# Patient Record
Sex: FEMALE | Race: WHITE | NOT HISPANIC OR LATINO
[De-identification: names, ages, dates, MRNs, and addresses within clinical notes are randomized per-mention and may not be internally consistent; named-entity substitution may affect disease eponyms.]

---

## 2019-01-27 ENCOUNTER — RESULT REVIEW (OUTPATIENT)
Age: 79
End: 2019-01-27

## 2022-12-06 PROBLEM — Z00.00 ENCOUNTER FOR PREVENTIVE HEALTH EXAMINATION: Status: ACTIVE | Noted: 2022-12-06

## 2022-12-07 ENCOUNTER — APPOINTMENT (OUTPATIENT)
Dept: PODIATRY | Facility: CLINIC | Age: 82
End: 2022-12-07

## 2022-12-07 DIAGNOSIS — M19.90 UNSPECIFIED OSTEOARTHRITIS, UNSPECIFIED SITE: ICD-10-CM

## 2022-12-07 DIAGNOSIS — B35.1 TINEA UNGUIUM: ICD-10-CM

## 2022-12-07 DIAGNOSIS — D64.9 ANEMIA, UNSPECIFIED: ICD-10-CM

## 2022-12-07 DIAGNOSIS — H35.30 UNSPECIFIED MACULAR DEGENERATION: ICD-10-CM

## 2022-12-07 DIAGNOSIS — Z83.511 FAMILY HISTORY OF GLAUCOMA: ICD-10-CM

## 2022-12-07 DIAGNOSIS — Z86.69 PERSONAL HISTORY OF OTHER DISEASES OF THE NERVOUS SYSTEM AND SENSE ORGANS: ICD-10-CM

## 2022-12-07 DIAGNOSIS — C43.9 MALIGNANT MELANOMA OF SKIN, UNSPECIFIED: ICD-10-CM

## 2022-12-07 PROCEDURE — 10060 I&D ABSCESS SIMPLE/SINGLE: CPT | Mod: RT

## 2022-12-07 PROCEDURE — 99203 OFFICE O/P NEW LOW 30 MIN: CPT | Mod: 25

## 2022-12-07 RX ORDER — TIMOLOL MALEATE 5 MG/ML
0.5 SOLUTION OPHTHALMIC
Qty: 10 | Refills: 0 | Status: ACTIVE | COMMUNITY
Start: 2022-11-22

## 2022-12-07 RX ORDER — LATANOPROST/PF 0.005 %
0.01 DROPS OPHTHALMIC (EYE)
Qty: 8 | Refills: 0 | Status: ACTIVE | COMMUNITY
Start: 2022-10-31

## 2022-12-07 RX ORDER — CIPROFLOXACIN HYDROCHLORIDE 250 MG/1
250 TABLET, FILM COATED ORAL
Qty: 20 | Refills: 0 | Status: DISCONTINUED | COMMUNITY
Start: 2022-07-25

## 2022-12-13 PROBLEM — B35.1 ONYCHOMYCOSIS: Status: ACTIVE | Noted: 2022-12-08

## 2022-12-13 NOTE — PHYSICAL EXAM
[1+] : left foot dorsalis pedis 1+ [No Joint Swelling] : no joint swelling [] : normal strength/tone [Normal Foot/Ankle] : Both lower extremities were exposed and visualized. Standing exam demonstrates normal foot posture and alignment. Hindfoot exam shows no hindfoot valgus or varus [Sensation] : the sensory exam was normal to light touch and pinprick [No Focal Deficits] : no focal deficits [Deep Tendon Reflexes (DTR)] : deep tendon reflexes were 2+ and symmetric [Motor Exam] : the motor exam was normal [FreeTextEntry3] : Positive hair growth. CFT: 1 second. [FreeTextEntry1] : Hallux mycotic nails. Distal 50% of the nail with onychomycosis and debris. Right tibial ingrown nail with pustule and paronychia at the tibial nail fold. It is inflamed and painful.

## 2022-12-13 NOTE — HISTORY OF PRESENT ILLNESS
[Other: ____] : [unfilled] [Cane] : a cane [FreeTextEntry1] : Patient presents today because of a right tibial ingrown nail with pain and pustule and paronychia at the right tibial border. She never had a problem before.She also has onychomycotic nails of the hallux bilateral. She follows with Dr. Flores who she last saw 4 months ago.\par

## 2022-12-13 NOTE — ASSESSMENT
[FreeTextEntry1] : \par Impression: Right hallux tibial ingrown nail.  Paronychia. Onychomycosis hallux nails bilateral. Pain.\par \par Treatment: The patient consented. I prepped the right hallux with alcohol. I used Ethyl Chloride and injected 2cc's Xylocaine 1% and performed a partial nail plate avulsion with I&D paronychia, right tibial border. It bled well.It is healthy. I put a Silvadene dressing on. She will leave it on until tomorrow. I expect this heals uneventfully. With any problems or worsening she will call the office. I also debrided mycotic nails without incident.

## 2023-05-30 ENCOUNTER — APPOINTMENT (OUTPATIENT)
Dept: PODIATRY | Facility: CLINIC | Age: 83
End: 2023-05-30
Payer: MEDICARE

## 2023-05-30 PROCEDURE — 99212 OFFICE O/P EST SF 10 MIN: CPT

## 2023-06-01 NOTE — ASSESSMENT
[FreeTextEntry1] : \par Impression: Right fibular ingrown nail. Pain.\par \par Treatment: I prepped the area with alcohol. I used topical Xylocaine. I used a small straight nail splitter and Tanacross blade to remove the offending spicule back towards the eponychium. I irrigated and cleansed it. I put a Silvadene dressing on. I gave her soaking instructions. Follow-up in the office only with continued pain that persists.

## 2023-06-01 NOTE — PHYSICAL EXAM
[1+] : left foot dorsalis pedis 1+ [No Joint Swelling] : no joint swelling [] : normal strength/tone [Normal Foot/Ankle] : Both lower extremities were exposed and visualized. Standing exam demonstrates normal foot posture and alignment. Hindfoot exam shows no hindfoot valgus or varus [Sensation] : the sensory exam was normal to light touch and pinprick [No Focal Deficits] : no focal deficits [Deep Tendon Reflexes (DTR)] : deep tendon reflexes were 2+ and symmetric [Motor Exam] : the motor exam was normal [FreeTextEntry3] : Positive hair growth. CFT: 1 second. [FreeTextEntry1] : Right hallux fibular incurvated nail. It is erythematous, inflamed and irritated. There is no aniya pus but a lot of pain.

## 2023-11-29 ENCOUNTER — APPOINTMENT (OUTPATIENT)
Dept: PODIATRY | Facility: CLINIC | Age: 83
End: 2023-11-29
Payer: MEDICARE

## 2023-11-29 DIAGNOSIS — M79.674 PAIN IN RIGHT TOE(S): ICD-10-CM

## 2023-11-29 DIAGNOSIS — L60.0 INGROWING NAIL: ICD-10-CM

## 2023-11-29 PROCEDURE — 99212 OFFICE O/P EST SF 10 MIN: CPT

## 2023-12-04 PROBLEM — M79.674 PAIN OF RIGHT GREAT TOE: Status: ACTIVE | Noted: 2023-05-31

## 2023-12-04 PROBLEM — L60.0 INGROWN NAIL: Status: ACTIVE | Noted: 2022-12-08

## 2024-03-05 ENCOUNTER — APPOINTMENT (OUTPATIENT)
Dept: PODIATRY | Facility: CLINIC | Age: 84
End: 2024-03-05
Payer: MEDICARE

## 2024-03-05 DIAGNOSIS — M79.675 PAIN IN RIGHT TOE(S): ICD-10-CM

## 2024-03-05 DIAGNOSIS — M79.674 PAIN IN RIGHT TOE(S): ICD-10-CM

## 2024-03-05 DIAGNOSIS — L03.031 CELLULITIS OF RIGHT TOE: ICD-10-CM

## 2024-03-05 DIAGNOSIS — L03.032 CELLULITIS OF LEFT TOE: ICD-10-CM

## 2024-03-05 PROCEDURE — 99212 OFFICE O/P EST SF 10 MIN: CPT

## 2024-03-05 NOTE — REASON FOR VISIT
[Follow-Up Visit] : a follow-up visit for [Foot Pain] : foot pain [Ingrown Nail] : ingrown nail [Onychomycosis] : onychomycosis

## 2024-03-06 PROBLEM — M79.674 PAIN IN TOES OF BOTH FEET: Status: ACTIVE | Noted: 2022-12-08

## 2024-03-06 PROBLEM — L03.031 PARONYCHIA OF TOENAIL OF RIGHT FOOT: Status: ACTIVE | Noted: 2022-12-08

## 2024-03-07 PROBLEM — L03.032 PARONYCHIA OF TOENAIL OF LEFT FOOT: Status: ACTIVE | Noted: 2024-03-06

## 2024-03-07 NOTE — HISTORY OF PRESENT ILLNESS
[FreeTextEntry1] : Patient presents today. She is 83 years old. She has bilateral ingrown nails at the hallux fibular border with paronychia. There is a little distal abscess with inflammation. There is pain with redness and localized infection.

## 2024-03-07 NOTE — PHYSICAL EXAM
[1+] : left foot dorsalis pedis 1+ [No Joint Swelling] : no joint swelling [] : normal strength/tone [Normal Foot/Ankle] : Both lower extremities were exposed and visualized. Standing exam demonstrates normal foot posture and alignment. Hindfoot exam shows no hindfoot valgus or varus [Sensation] : the sensory exam was normal to light touch and pinprick [No Focal Deficits] : no focal deficits [Deep Tendon Reflexes (DTR)] : deep tendon reflexes were 2+ and symmetric [Motor Exam] : the motor exam was normal [FreeTextEntry3] : Positive hair growth. CFT: 1 second. [FreeTextEntry1] : Hallux nails are mycotic and ingrown.  There is fibular border distal abscess with erythema, inflammation and pain.

## 2024-03-07 NOTE — ASSESSMENT
[FreeTextEntry1] : Impression: Paronychia. Pain bilateral hallux nails.  Treatment:  After obtaining verbal consent, a time out was performed to the identified area of maximal tenderness. I prepped both toes with 70% alcohol, opened a debridement tray and ended up debriding the nail back and performing a wedge resection, which allowed the paronychia drain easily. I cleansed the area. I put an antibiotic dressing on that she will leave on until tomorrow. She could soak with warm water and Epsom salt. I expect this heals uneventfully.

## 2024-03-10 ENCOUNTER — INPATIENT (INPATIENT)
Facility: HOSPITAL | Age: 84
LOS: 8 days | Discharge: NURSING, REHAB OR LONG-TERM CARE | DRG: 871 | End: 2024-03-19
Attending: INTERNAL MEDICINE | Admitting: INTERNAL MEDICINE
Payer: MEDICARE

## 2024-03-10 VITALS
SYSTOLIC BLOOD PRESSURE: 149 MMHG | RESPIRATION RATE: 20 BRPM | HEART RATE: 116 BPM | DIASTOLIC BLOOD PRESSURE: 62 MMHG | OXYGEN SATURATION: 94 %

## 2024-03-10 DIAGNOSIS — H35.30 UNSPECIFIED MACULAR DEGENERATION: ICD-10-CM

## 2024-03-10 DIAGNOSIS — D69.6 THROMBOCYTOPENIA, UNSPECIFIED: ICD-10-CM

## 2024-03-10 DIAGNOSIS — N39.0 URINARY TRACT INFECTION, SITE NOT SPECIFIED: ICD-10-CM

## 2024-03-10 DIAGNOSIS — A41.9 SEPSIS, UNSPECIFIED ORGANISM: ICD-10-CM

## 2024-03-10 DIAGNOSIS — Z29.9 ENCOUNTER FOR PROPHYLACTIC MEASURES, UNSPECIFIED: ICD-10-CM

## 2024-03-10 DIAGNOSIS — E78.5 HYPERLIPIDEMIA, UNSPECIFIED: ICD-10-CM

## 2024-03-10 DIAGNOSIS — Z96.649 PRESENCE OF UNSPECIFIED ARTIFICIAL HIP JOINT: Chronic | ICD-10-CM

## 2024-03-10 DIAGNOSIS — Z98.49 CATARACT EXTRACTION STATUS, UNSPECIFIED EYE: Chronic | ICD-10-CM

## 2024-03-10 LAB
ADD ON TEST-SPECIMEN IN LAB: SIGNIFICANT CHANGE UP
ALBUMIN SERPL ELPH-MCNC: 4 G/DL — SIGNIFICANT CHANGE UP (ref 3.3–5)
ALP SERPL-CCNC: 106 U/L — SIGNIFICANT CHANGE UP (ref 40–120)
ALT FLD-CCNC: 43 U/L — SIGNIFICANT CHANGE UP (ref 10–45)
ANION GAP SERPL CALC-SCNC: 15 MMOL/L — SIGNIFICANT CHANGE UP (ref 5–17)
APPEARANCE UR: ABNORMAL
APTT BLD: 32.6 SEC — SIGNIFICANT CHANGE UP (ref 24.5–35.6)
AST SERPL-CCNC: 43 U/L — HIGH (ref 10–40)
BACTERIA # UR AUTO: ABNORMAL /HPF
BASE EXCESS BLDV CALC-SCNC: -1.1 MMOL/L — SIGNIFICANT CHANGE UP (ref -2–3)
BASE EXCESS BLDV CALC-SCNC: -11.6 MMOL/L — LOW (ref -2–3)
BASE EXCESS BLDV CALC-SCNC: -5.1 MMOL/L — LOW (ref -2–3)
BASOPHILS # BLD AUTO: 0.06 K/UL — SIGNIFICANT CHANGE UP (ref 0–0.2)
BASOPHILS NFR BLD AUTO: 0.9 % — SIGNIFICANT CHANGE UP (ref 0–2)
BILIRUB SERPL-MCNC: 1.2 MG/DL — SIGNIFICANT CHANGE UP (ref 0.2–1.2)
BILIRUB UR-MCNC: NEGATIVE — SIGNIFICANT CHANGE UP
BUN SERPL-MCNC: 35 MG/DL — HIGH (ref 7–23)
CA-I SERPL-SCNC: 0.87 MMOL/L — LOW (ref 1.15–1.33)
CA-I SERPL-SCNC: 1.15 MMOL/L — SIGNIFICANT CHANGE UP (ref 1.15–1.33)
CA-I SERPL-SCNC: 1.2 MMOL/L — SIGNIFICANT CHANGE UP (ref 1.15–1.33)
CALCIUM SERPL-MCNC: 9.4 MG/DL — SIGNIFICANT CHANGE UP (ref 8.4–10.5)
CAST: 5 /LPF — HIGH (ref 0–4)
CHLORIDE BLDV-SCNC: 107 MMOL/L — SIGNIFICANT CHANGE UP (ref 96–108)
CHLORIDE BLDV-SCNC: 110 MMOL/L — HIGH (ref 96–108)
CHLORIDE BLDV-SCNC: 117 MMOL/L — HIGH (ref 96–108)
CHLORIDE SERPL-SCNC: 107 MMOL/L — SIGNIFICANT CHANGE UP (ref 96–108)
CO2 BLDV-SCNC: 14 MMOL/L — LOW (ref 22–26)
CO2 BLDV-SCNC: 21 MMOL/L — LOW (ref 22–26)
CO2 BLDV-SCNC: 25 MMOL/L — SIGNIFICANT CHANGE UP (ref 22–26)
CO2 SERPL-SCNC: 22 MMOL/L — SIGNIFICANT CHANGE UP (ref 22–31)
COLOR SPEC: YELLOW — SIGNIFICANT CHANGE UP
CREAT SERPL-MCNC: 1.25 MG/DL — SIGNIFICANT CHANGE UP (ref 0.5–1.3)
DIFF PNL FLD: ABNORMAL
EGFR: 43 ML/MIN/1.73M2 — LOW
EOSINOPHIL # BLD AUTO: 0 K/UL — SIGNIFICANT CHANGE UP (ref 0–0.5)
EOSINOPHIL NFR BLD AUTO: 0 % — SIGNIFICANT CHANGE UP (ref 0–6)
FLUAV AG NPH QL: SIGNIFICANT CHANGE UP
FLUBV AG NPH QL: SIGNIFICANT CHANGE UP
GAS PNL BLDV: 140 MMOL/L — SIGNIFICANT CHANGE UP (ref 136–145)
GAS PNL BLDV: 141 MMOL/L — SIGNIFICANT CHANGE UP (ref 136–145)
GAS PNL BLDV: 142 MMOL/L — SIGNIFICANT CHANGE UP (ref 136–145)
GAS PNL BLDV: SIGNIFICANT CHANGE UP
GLUCOSE BLDV-MCNC: 123 MG/DL — HIGH (ref 70–99)
GLUCOSE BLDV-MCNC: 129 MG/DL — HIGH (ref 70–99)
GLUCOSE BLDV-MCNC: 142 MG/DL — HIGH (ref 70–99)
GLUCOSE SERPL-MCNC: 130 MG/DL — HIGH (ref 70–99)
GLUCOSE UR QL: NEGATIVE MG/DL — SIGNIFICANT CHANGE UP
HCO3 BLDV-SCNC: 14 MMOL/L — LOW (ref 22–29)
HCO3 BLDV-SCNC: 20 MMOL/L — LOW (ref 22–29)
HCO3 BLDV-SCNC: 24 MMOL/L — SIGNIFICANT CHANGE UP (ref 22–29)
HCT VFR BLD CALC: 35.5 % — SIGNIFICANT CHANGE UP (ref 34.5–45)
HCT VFR BLDA CALC: 19 % — CRITICAL LOW (ref 34.5–46.5)
HCT VFR BLDA CALC: 28 % — LOW (ref 34.5–46.5)
HCT VFR BLDA CALC: 35 % — SIGNIFICANT CHANGE UP (ref 34.5–46.5)
HGB BLD CALC-MCNC: 11.5 G/DL — LOW (ref 11.7–16.1)
HGB BLD CALC-MCNC: 6.3 G/DL — CRITICAL LOW (ref 11.7–16.1)
HGB BLD CALC-MCNC: 9.3 G/DL — LOW (ref 11.7–16.1)
HGB BLD-MCNC: 11.3 G/DL — LOW (ref 11.5–15.5)
INR BLD: 1.15 RATIO — SIGNIFICANT CHANGE UP (ref 0.85–1.18)
KETONES UR-MCNC: 40 MG/DL
LACTATE BLDV-MCNC: 2.3 MMOL/L — HIGH (ref 0.5–2)
LACTATE BLDV-MCNC: 3.2 MMOL/L — HIGH (ref 0.5–2)
LACTATE BLDV-MCNC: 3.9 MMOL/L — HIGH (ref 0.5–2)
LEUKOCYTE ESTERASE UR-ACNC: ABNORMAL
LYMPHOCYTES # BLD AUTO: 0.43 K/UL — LOW (ref 1–3.3)
LYMPHOCYTES # BLD AUTO: 6.5 % — LOW (ref 13–44)
MAGNESIUM SERPL-MCNC: 1.8 MG/DL — SIGNIFICANT CHANGE UP (ref 1.6–2.6)
MCHC RBC-ENTMCNC: 20.3 PG — LOW (ref 27–34)
MCHC RBC-ENTMCNC: 31.8 GM/DL — LOW (ref 32–36)
MCV RBC AUTO: 63.7 FL — LOW (ref 80–100)
MONOCYTES # BLD AUTO: 0.18 K/UL — SIGNIFICANT CHANGE UP (ref 0–0.9)
MONOCYTES NFR BLD AUTO: 2.8 % — SIGNIFICANT CHANGE UP (ref 2–14)
NEUTROPHILS # BLD AUTO: 5.63 K/UL — SIGNIFICANT CHANGE UP (ref 1.8–7.4)
NEUTROPHILS NFR BLD AUTO: 74.8 % — SIGNIFICANT CHANGE UP (ref 43–77)
NITRITE UR-MCNC: POSITIVE
PCO2 BLDV: 27 MMHG — LOW (ref 39–42)
PCO2 BLDV: 35 MMHG — LOW (ref 39–42)
PCO2 BLDV: 38 MMHG — LOW (ref 39–42)
PH BLDV: 7.31 — LOW (ref 7.32–7.43)
PH BLDV: 7.36 — SIGNIFICANT CHANGE UP (ref 7.32–7.43)
PH BLDV: 7.4 — SIGNIFICANT CHANGE UP (ref 7.32–7.43)
PH UR: 6 — SIGNIFICANT CHANGE UP (ref 5–8)
PHOSPHATE SERPL-MCNC: 2.1 MG/DL — LOW (ref 2.5–4.5)
PLATELET # BLD AUTO: 138 K/UL — LOW (ref 150–400)
PO2 BLDV: 31 MMHG — SIGNIFICANT CHANGE UP (ref 25–45)
PO2 BLDV: 33 MMHG — SIGNIFICANT CHANGE UP (ref 25–45)
PO2 BLDV: 42 MMHG — SIGNIFICANT CHANGE UP (ref 25–45)
POTASSIUM BLDV-SCNC: 2 MMOL/L — CRITICAL LOW (ref 3.5–5.1)
POTASSIUM BLDV-SCNC: 3 MMOL/L — LOW (ref 3.5–5.1)
POTASSIUM BLDV-SCNC: 3.6 MMOL/L — SIGNIFICANT CHANGE UP (ref 3.5–5.1)
POTASSIUM SERPL-MCNC: 3.7 MMOL/L — SIGNIFICANT CHANGE UP (ref 3.5–5.3)
POTASSIUM SERPL-SCNC: 3.7 MMOL/L — SIGNIFICANT CHANGE UP (ref 3.5–5.3)
PROT SERPL-MCNC: 6.7 G/DL — SIGNIFICANT CHANGE UP (ref 6–8.3)
PROT UR-MCNC: 30 MG/DL
PROTHROM AB SERPL-ACNC: 12 SEC — SIGNIFICANT CHANGE UP (ref 9.5–13)
RBC # BLD: 5.57 M/UL — HIGH (ref 3.8–5.2)
RBC # FLD: 15.9 % — HIGH (ref 10.3–14.5)
RBC CASTS # UR COMP ASSIST: 19 /HPF — HIGH (ref 0–4)
RSV RNA NPH QL NAA+NON-PROBE: SIGNIFICANT CHANGE UP
SAO2 % BLDV: 55.4 % — LOW (ref 67–88)
SAO2 % BLDV: 60.2 % — LOW (ref 67–88)
SAO2 % BLDV: 72 % — SIGNIFICANT CHANGE UP (ref 67–88)
SARS-COV-2 RNA SPEC QL NAA+PROBE: SIGNIFICANT CHANGE UP
SODIUM SERPL-SCNC: 144 MMOL/L — SIGNIFICANT CHANGE UP (ref 135–145)
SP GR SPEC: 1.02 — SIGNIFICANT CHANGE UP (ref 1–1.03)
SQUAMOUS # UR AUTO: 1 /HPF — SIGNIFICANT CHANGE UP (ref 0–5)
UROBILINOGEN FLD QL: 0.2 MG/DL — SIGNIFICANT CHANGE UP (ref 0.2–1)
WBC # BLD: 6.55 K/UL — SIGNIFICANT CHANGE UP (ref 3.8–10.5)
WBC # FLD AUTO: 6.55 K/UL — SIGNIFICANT CHANGE UP (ref 3.8–10.5)
WBC UR QL: 124 /HPF — HIGH (ref 0–5)

## 2024-03-10 PROCEDURE — 71045 X-RAY EXAM CHEST 1 VIEW: CPT | Mod: 26

## 2024-03-10 PROCEDURE — 99223 1ST HOSP IP/OBS HIGH 75: CPT

## 2024-03-10 PROCEDURE — 99285 EMERGENCY DEPT VISIT HI MDM: CPT

## 2024-03-10 PROCEDURE — 70450 CT HEAD/BRAIN W/O DYE: CPT | Mod: 26,MC

## 2024-03-10 RX ORDER — POTASSIUM PHOSPHATE, MONOBASIC POTASSIUM PHOSPHATE, DIBASIC 236; 224 MG/ML; MG/ML
15 INJECTION, SOLUTION INTRAVENOUS ONCE
Refills: 0 | Status: COMPLETED | OUTPATIENT
Start: 2024-03-10 | End: 2024-03-11

## 2024-03-10 RX ORDER — SODIUM CHLORIDE 9 MG/ML
1000 INJECTION INTRAMUSCULAR; INTRAVENOUS; SUBCUTANEOUS ONCE
Refills: 0 | Status: COMPLETED | OUTPATIENT
Start: 2024-03-10 | End: 2024-03-10

## 2024-03-10 RX ORDER — TIMOLOL 0.5 %
1 DROPS OPHTHALMIC (EYE)
Refills: 0 | Status: DISCONTINUED | OUTPATIENT
Start: 2024-03-10 | End: 2024-03-19

## 2024-03-10 RX ORDER — LATANOPROST 0.05 MG/ML
1 SOLUTION/ DROPS OPHTHALMIC; TOPICAL AT BEDTIME
Refills: 0 | Status: DISCONTINUED | OUTPATIENT
Start: 2024-03-10 | End: 2024-03-19

## 2024-03-10 RX ORDER — ACETAMINOPHEN 500 MG
650 TABLET ORAL EVERY 6 HOURS
Refills: 0 | Status: DISCONTINUED | OUTPATIENT
Start: 2024-03-10 | End: 2024-03-19

## 2024-03-10 RX ORDER — ACETAMINOPHEN 500 MG
1000 TABLET ORAL ONCE
Refills: 0 | Status: COMPLETED | OUTPATIENT
Start: 2024-03-10 | End: 2024-03-10

## 2024-03-10 RX ORDER — PIPERACILLIN AND TAZOBACTAM 4; .5 G/20ML; G/20ML
3.38 INJECTION, POWDER, LYOPHILIZED, FOR SOLUTION INTRAVENOUS ONCE
Refills: 0 | Status: COMPLETED | OUTPATIENT
Start: 2024-03-11 | End: 2024-03-11

## 2024-03-10 RX ORDER — POTASSIUM CHLORIDE 20 MEQ
10 PACKET (EA) ORAL
Refills: 0 | Status: DISCONTINUED | OUTPATIENT
Start: 2024-03-10 | End: 2024-03-10

## 2024-03-10 RX ORDER — ATORVASTATIN CALCIUM 80 MG/1
20 TABLET, FILM COATED ORAL AT BEDTIME
Refills: 0 | Status: DISCONTINUED | OUTPATIENT
Start: 2024-03-10 | End: 2024-03-19

## 2024-03-10 RX ORDER — ENOXAPARIN SODIUM 100 MG/ML
40 INJECTION SUBCUTANEOUS EVERY 24 HOURS
Refills: 0 | Status: DISCONTINUED | OUTPATIENT
Start: 2024-03-10 | End: 2024-03-19

## 2024-03-10 RX ORDER — PIPERACILLIN AND TAZOBACTAM 4; .5 G/20ML; G/20ML
3.38 INJECTION, POWDER, LYOPHILIZED, FOR SOLUTION INTRAVENOUS ONCE
Refills: 0 | Status: COMPLETED | OUTPATIENT
Start: 2024-03-10 | End: 2024-03-10

## 2024-03-10 RX ORDER — VANCOMYCIN HCL 1 G
1000 VIAL (EA) INTRAVENOUS ONCE
Refills: 0 | Status: COMPLETED | OUTPATIENT
Start: 2024-03-10 | End: 2024-03-10

## 2024-03-10 RX ORDER — PIPERACILLIN AND TAZOBACTAM 4; .5 G/20ML; G/20ML
3.38 INJECTION, POWDER, LYOPHILIZED, FOR SOLUTION INTRAVENOUS EVERY 8 HOURS
Refills: 0 | Status: DISCONTINUED | OUTPATIENT
Start: 2024-03-11 | End: 2024-03-12

## 2024-03-10 RX ADMIN — Medication 250 MILLIGRAM(S): at 15:15

## 2024-03-10 RX ADMIN — SODIUM CHLORIDE 1000 MILLILITER(S): 9 INJECTION INTRAMUSCULAR; INTRAVENOUS; SUBCUTANEOUS at 22:39

## 2024-03-10 RX ADMIN — PIPERACILLIN AND TAZOBACTAM 200 GRAM(S): 4; .5 INJECTION, POWDER, LYOPHILIZED, FOR SOLUTION INTRAVENOUS at 14:59

## 2024-03-10 RX ADMIN — SODIUM CHLORIDE 1000 MILLILITER(S): 9 INJECTION INTRAMUSCULAR; INTRAVENOUS; SUBCUTANEOUS at 16:58

## 2024-03-10 RX ADMIN — PIPERACILLIN AND TAZOBACTAM 200 GRAM(S): 4; .5 INJECTION, POWDER, LYOPHILIZED, FOR SOLUTION INTRAVENOUS at 22:38

## 2024-03-10 RX ADMIN — SODIUM CHLORIDE 1000 MILLILITER(S): 9 INJECTION INTRAMUSCULAR; INTRAVENOUS; SUBCUTANEOUS at 14:59

## 2024-03-10 RX ADMIN — Medication 400 MILLIGRAM(S): at 14:10

## 2024-03-10 NOTE — H&P ADULT - NSHPPHYSICALEXAM_GEN_ALL_CORE
Vital Signs Last 24 Hrs  T(C): 36.8 (10 Mar 2024 22:05), Max: 39.5 (10 Mar 2024 14:25)  T(F): 98.2 (10 Mar 2024 22:05), Max: 103.1 (10 Mar 2024 14:25)  HR: 92 (10 Mar 2024 22:05) (92 - 116)  BP: 102/56 (10 Mar 2024 22:05) (91/62 - 149/62)  BP(mean): 71 (10 Mar 2024 22:05) (67 - 72)  RR: 20 (10 Mar 2024 22:05) (18 - 26)  SpO2: 97% (10 Mar 2024 22:05) (94% - 98%)    Parameters below as of 10 Mar 2024 22:05  Patient On (Oxygen Delivery Method): nasal cannula, high flow    CONSTITUTIONAL: Well-groomed, in no apparent distress  EYES: No conjunctival or scleral injection, non-icteric;   ENMT: No external nasal lesions; MMM  NECK: Trachea midline without palpable neck mass; thyroid not enlarged and non-tender  RESPIRATORY: Breathing comfortably; no dullness to percussion; Diminished breath sounds over left base. Otherwise lungs CTA without wheeze/rhonchi/rales  CARDIOVASCULAR: +S1S2, RRR, no M/G/R; pedal pulses full and symmetric; no lower extremity edema  GASTROINTESTINAL: No palpable masses or tenderness, +BS throughout, no rebound/guarding; no hepatosplenomegaly; no hernia palpated  LYMPHATIC: No cervical LAD or tenderness  SKIN: No rashes or ulcers noted  NEUROLOGIC: CN II-XII intact; sensation intact in LEs b/l to light touch  PSYCHIATRIC: A&Ox3; mood and affect appropriate; appropriate insight and judgment

## 2024-03-10 NOTE — ED ADULT NURSE NOTE - OBJECTIVE STATEMENT
Patient is a 83y female presenting to the ED via EMS from home s/p fall. According to EMS, family last heard from the patient at approximately 9pm last night but did not hear from her this morning; patient's neighbor found her to be on the ground. Patient is a 83y female presenting to the ED via EMS from home s/p fall. According to EMS, family last heard from the patient at approximately 9pm last night but did not hear from her this morning; patient's neighbor found her to be on the ground. Reports patient was feeling well yesterday. Unknown if patient fell, unknown how long she was on the ground for. No signs of trauma on the head. Patient is warm, diaphoretic, alert to verbal stimuli. Patient is not speaking currently. Respirations spontaneous, even, and labored. Abdomen soft, non-distended and non-tender upon palpation. No swelling noted in bilateral lower extremities. Denies chest pain, SOB, headache, N/V/D, abdominal pain, fever/chills, burning upon urination or difficulty urinating.

## 2024-03-10 NOTE — H&P ADULT - NSHPLABSRESULTS_GEN_ALL_CORE
LABS:                      11.3   6.55  )-----------( 138      ( 10 Mar 2024 14:30 )             35.5     03-10    144  |  107  |  35<H>  ----------------------------<  130<H>  3.7   |  22  |  1.25    Ca    9.4      10 Mar 2024 14:30  Phos  2.1     03-10  Mg     1.8     03-10    TPro  6.7  /  Alb  4.0  /  TBili  1.2  /  DBili  x   /  AST  43<H>  /  ALT  43  /  AlkPhos  106  03-10    CARDIAC MARKERS ( 10 Mar 2024 14:30 )  x     / x     / 41 U/L / x     / x        LIVER FUNCTIONS - ( 10 Mar 2024 14:30 )  Alb: 4.0 g/dL / Pro: 6.7 g/dL / ALK PHOS: 106 U/L / ALT: 43 U/L / AST: 43 U/L / GGT: x           PT/INR - ( 10 Mar 2024 14:30 )   PT: 12.0 sec;   INR: 1.15 ratio    PTT - ( 10 Mar 2024 14:30 )  PTT:32.6 sec    Urinalysis Basic - ( 10 Mar 2024 15:07 )  Color: Yellow / Appearance: Cloudy / S.018 / pH: x  Gluc: x / Ketone: 40 mg/dL  / Bili: Negative / Urobili: 0.2 mg/dL   Blood: x / Protein: 30 mg/dL / Nitrite: Positive   Leuk Esterase: Moderate / RBC: 19 /HPF /  /HPF   Sq Epi: x / Non Sq Epi: 1 /HPF / Bacteria: Too Numerous to count /HPF    IMAGING:  Xray Chest 1 View- PORTABLE-Urgent (03.10.24 @ 15:05)  IMPRESSION:  - Nonspecific patchy retrocardiac opacities, which may reflect infectious etiology in the correct clinical context versus passive atelectasis in this expiratory view    CT Head No Cont (03.10.24 @ 16:30)  IMPRESSION:  - Trace hyperattenuation along the left middle frontal cortex, likely reflecting gyriform mineralization. No other suspicion of acute intracranial bleeding.  - Bilateral occipital encephalomalacia.  - Central volume loss and advanced microvascular ischemic changes.

## 2024-03-10 NOTE — H&P ADULT - PROBLEM SELECTOR PLAN 1
- Febrile w/ leukocytosis and positive UA  - Also w/ atelectasis vs infection on CXR  - Given management w/ broad spectrum abx for now can hold on CT chest as management would remain the same  - s/p Vancomycin and Zosyn in the ED  - c/w Zosyn and Vancomycin for now, narrow zosyn based on cx results and can dc vanco if MRSA PCR negative  - MRSA PCR  - Ur legionella and strep ag  - Blood and Ur Cx   - On HFNC now satting >96%, can downtitrate to NC as tolerated

## 2024-03-10 NOTE — ED ADULT NURSE NOTE - NSFALLRISKINTERV_ED_ALL_ED

## 2024-03-10 NOTE — ED PROVIDER NOTE - OBJECTIVE STATEMENT
83-year-old female with no significant past medical history presents ED brought in by EMS for hypoxia, altered mental status.  Patient was found by a neighbor on the floor after patient's daughter was not able to get in contact with her at any point today.  Patient last spoke with the daughter via text at 930 last night patient was feeling fine and had no complaints.  Patient is currently extremely lethargic and not conversive. Patient is a full code.

## 2024-03-10 NOTE — H&P ADULT - ASSESSMENT
84yo F w/ PMH of HLD, macular degeneration, thalassemia minor and melanoma pw AMS and hypoxia found to be in severe sepsis i/s/o UTI +/- PNA.

## 2024-03-10 NOTE — H&P ADULT - HISTORY OF PRESENT ILLNESS
Pt is an 82yo F w/ PMH of HLD, macular degeneration, thalassemia minor and melanoma pw AMS and hypoxia.     Hx obtained from daughter at bedside along w/ patient.     Reports feeling well yesterday w/out acute symptoms. Today daughter was unable to reach pt so had neighbor perform wellness check at which point pt was found altered in her bed for which EMS was called.     Patient denies any acute symptoms such as F/C, CP, SOB, cough, nasal congestion/rhinorrhea, abd pain, N/V, constipation or diarrhea. Does wear depends when sleeping d/t incontinence but denies any dysuria. She was otherwise unable to recall the events that transpired this afternoon.     In the ED febrile, tachy w/ hypoxia (80% on RA per EMS) w/ lactic acidosis w/ positive UA and CXR w/ c/f PNA vs atelectasis s/p Vanc, Zosyn, 2L IVF and HFNC for hypoxia.

## 2024-03-10 NOTE — H&P ADULT - PROBLEM SELECTOR PLAN 2
- Positive UA w/ use of adult diapers at home  - c/w Abx as above  - f/u Ur cx and narrow abx based on sensitivities

## 2024-03-10 NOTE — ED PROVIDER NOTE - ATTENDING CONTRIBUTION TO CARE
83-year-old female, denies past medical history, brought in by EMS from home for hypoxia (80% was on room air) and lethargy.  Per EMS, patient last tested family at 9:30 PM last night, and seem to be in normal health.  Today family was unable to reach patient, neighbor with keys went to check and found patient down and minimally responsive.  Per family, patient home was very warm (with thermostat turned up) and patient was wearing heavy sweater.  Was assumed patient had significant chills.  Patient unable to participate in interview, thus history provided mostly by EMS and family.  Per daughter, patient able to perform her own ADLs and usually alert and conversive.    Vital signs significant for fever, tachycardia, tachypnea, and hypoxia.  BP is stable at this time.  Vital signs meeting SIRS criteria.  EKG is sinus tachycardia without ischemic changes.  Physical exam significant for ill/lethargic appearing patient in moderate respiratory distress.  Head is normocephalic is atraumatic.  Extraocular movements intact.  Pupils equal round and reactive to light.  No conjunctival pallor.  Oropharynx is clear.  Heart is tachycardic without murmur.  Lungs clear to auscultation (however poor respiratory effort).  Abdomen is soft and nontender/nondistended.  No CVA tenderness.  No lower extremity edema.  Pulses are 2+ throughout.  Neuroexam is nonfocal.  Skin is warm and well-perfused without rashes.    Patient presentation meeting SIRS criteria.  Differential diagnosis includes but not limited to sepsis secondary to UTI versus pneumonia versus viral syndrome.  Plan to perform sepsis workup.  Will send basic labs/electrolytes, blood cultures, UA/urine culture, CK, flu/COVID swab, VBG, chest x-ray, and CT head.  Will start empiric antibiotics, IV fluid, and IV Tylenol.  Patient likely will require medical admission for further IV antibiotics.

## 2024-03-10 NOTE — ED PROVIDER NOTE - PHYSICAL EXAMINATION
GEN: Patient lethargic but responsive   HEENT: normocephalic, atraumatic, dry MM  CARDIAC: RRR, S1, S2, no murmur.   PULM: CTA B/L no wheeze, rhonchi, rales.   ABD: soft NT, ND, no rebound no guarding  MSK: Moving all extremities, no edema.   NEURO: A&Ox1, no focal neurological deficits  SKIN: warm, no rash. Diaphoretic

## 2024-03-10 NOTE — ED ADULT NURSE REASSESSMENT NOTE - NS ED NURSE REASSESS COMMENT FT1
Report received from Salty SIDDIQUI. Pt A&Ox2, oriented to self and location. Pt in no acute distress at time. Family remains at bedside. Pt awaiting bed assignment at this time. Patient safety maintained, bed is in lowest position, wheels locked, and side rails raised.

## 2024-03-10 NOTE — ED CLERICAL - BED REQUESTED
10-Mar-2024 17:42 I have reviewed and confirmed nurses' notes for patient's medications, allergies, medical history, and surgical history.

## 2024-03-10 NOTE — H&P ADULT - PROBLEM SELECTOR PLAN 3
- PLTs 138 on presentation likely i/s/o above  - Monitor while inpt w/ daily CBC and consider change of Lovenox ppx to SCDs if downtrending

## 2024-03-10 NOTE — ED PROVIDER NOTE - CLINICAL SUMMARY MEDICAL DECISION MAKING FREE TEXT BOX
Gulshan Marks, DO PGY-3: 83-year-old female with no significant past medical history presents ED brought in by EMS for hypoxia, altered mental status.  Patient was found by a neighbor on the floor after patient's daughter was not able to get in contact with her at any point today.  Patient last spoke with the daughter via text at 930 last night patient was feeling fine and had no complaints.  Patient is currently extremely lethargic and not conversive. Patient febrile to 103 Fahrenheit, hypoxic 90% on room air, minimal improvement on 6 L nasal cannula will escalate to high flow nasal cannula, otherwise nonfocal physical exam aside from the lethargy.  Differential clues not limited to sepsis secondary to likely UTI versus pneumonia.  Plan for antipyretics, septic workup, IV fluids, antibiotics, high flow nasal cannula.  Admission.

## 2024-03-11 DIAGNOSIS — R78.81 BACTEREMIA: ICD-10-CM

## 2024-03-11 DIAGNOSIS — N39.0 URINARY TRACT INFECTION, SITE NOT SPECIFIED: ICD-10-CM

## 2024-03-11 DIAGNOSIS — J18.9 PNEUMONIA, UNSPECIFIED ORGANISM: ICD-10-CM

## 2024-03-11 LAB
ANION GAP SERPL CALC-SCNC: 13 MMOL/L — SIGNIFICANT CHANGE UP (ref 5–17)
BUN SERPL-MCNC: 33 MG/DL — HIGH (ref 7–23)
CALCIUM SERPL-MCNC: 8.4 MG/DL — SIGNIFICANT CHANGE UP (ref 8.4–10.5)
CHLORIDE SERPL-SCNC: 107 MMOL/L — SIGNIFICANT CHANGE UP (ref 96–108)
CO2 SERPL-SCNC: 20 MMOL/L — LOW (ref 22–31)
CREAT SERPL-MCNC: 1.05 MG/DL — SIGNIFICANT CHANGE UP (ref 0.5–1.3)
E COLI DNA BLD POS QL NAA+NON-PROBE: SIGNIFICANT CHANGE UP
EGFR: 53 ML/MIN/1.73M2 — LOW
GLUCOSE SERPL-MCNC: 117 MG/DL — HIGH (ref 70–99)
GRAM STN FLD: ABNORMAL
HCT VFR BLD CALC: 31 % — LOW (ref 34.5–45)
HGB BLD-MCNC: 9.7 G/DL — LOW (ref 11.5–15.5)
LEGIONELLA AG UR QL: NEGATIVE — SIGNIFICANT CHANGE UP
MAGNESIUM SERPL-MCNC: 1.6 MG/DL — SIGNIFICANT CHANGE UP (ref 1.6–2.6)
MCHC RBC-ENTMCNC: 20 PG — LOW (ref 27–34)
MCHC RBC-ENTMCNC: 31.3 GM/DL — LOW (ref 32–36)
MCV RBC AUTO: 64 FL — LOW (ref 80–100)
METHOD TYPE: SIGNIFICANT CHANGE UP
MRSA PCR RESULT.: SIGNIFICANT CHANGE UP
NRBC # BLD: 0 /100 WBCS — SIGNIFICANT CHANGE UP (ref 0–0)
PHOSPHATE SERPL-MCNC: 3.7 MG/DL — SIGNIFICANT CHANGE UP (ref 2.5–4.5)
PLATELET # BLD AUTO: 138 K/UL — LOW (ref 150–400)
POTASSIUM SERPL-MCNC: 4.1 MMOL/L — SIGNIFICANT CHANGE UP (ref 3.5–5.3)
POTASSIUM SERPL-SCNC: 4.1 MMOL/L — SIGNIFICANT CHANGE UP (ref 3.5–5.3)
RBC # BLD: 4.84 M/UL — SIGNIFICANT CHANGE UP (ref 3.8–5.2)
RBC # FLD: 16.3 % — HIGH (ref 10.3–14.5)
S AUREUS DNA NOSE QL NAA+PROBE: SIGNIFICANT CHANGE UP
S PNEUM AG UR QL: NEGATIVE — SIGNIFICANT CHANGE UP
SODIUM SERPL-SCNC: 140 MMOL/L — SIGNIFICANT CHANGE UP (ref 135–145)
SPECIMEN SOURCE: SIGNIFICANT CHANGE UP
SPECIMEN SOURCE: SIGNIFICANT CHANGE UP
WBC # BLD: 27.92 K/UL — HIGH (ref 3.8–10.5)
WBC # FLD AUTO: 27.92 K/UL — HIGH (ref 3.8–10.5)

## 2024-03-11 PROCEDURE — 71250 CT THORAX DX C-: CPT | Mod: 26

## 2024-03-11 PROCEDURE — 99222 1ST HOSP IP/OBS MODERATE 55: CPT

## 2024-03-11 RX ORDER — CHLORHEXIDINE GLUCONATE 213 G/1000ML
1 SOLUTION TOPICAL DAILY
Refills: 0 | Status: DISCONTINUED | OUTPATIENT
Start: 2024-03-11 | End: 2024-03-19

## 2024-03-11 RX ORDER — VANCOMYCIN HCL 1 G
750 VIAL (EA) INTRAVENOUS EVERY 24 HOURS
Refills: 0 | Status: DISCONTINUED | OUTPATIENT
Start: 2024-03-11 | End: 2024-03-11

## 2024-03-11 RX ORDER — IPRATROPIUM/ALBUTEROL SULFATE 18-103MCG
3 AEROSOL WITH ADAPTER (GRAM) INHALATION EVERY 6 HOURS
Refills: 0 | Status: DISCONTINUED | OUTPATIENT
Start: 2024-03-11 | End: 2024-03-19

## 2024-03-11 RX ORDER — ONDANSETRON 8 MG/1
4 TABLET, FILM COATED ORAL ONCE
Refills: 0 | Status: COMPLETED | OUTPATIENT
Start: 2024-03-11 | End: 2024-03-11

## 2024-03-11 RX ADMIN — ENOXAPARIN SODIUM 40 MILLIGRAM(S): 100 INJECTION SUBCUTANEOUS at 06:03

## 2024-03-11 RX ADMIN — PIPERACILLIN AND TAZOBACTAM 25 GRAM(S): 4; .5 INJECTION, POWDER, LYOPHILIZED, FOR SOLUTION INTRAVENOUS at 15:59

## 2024-03-11 RX ADMIN — PIPERACILLIN AND TAZOBACTAM 25 GRAM(S): 4; .5 INJECTION, POWDER, LYOPHILIZED, FOR SOLUTION INTRAVENOUS at 08:06

## 2024-03-11 RX ADMIN — PIPERACILLIN AND TAZOBACTAM 25 GRAM(S): 4; .5 INJECTION, POWDER, LYOPHILIZED, FOR SOLUTION INTRAVENOUS at 02:35

## 2024-03-11 RX ADMIN — Medication 3 MILLILITER(S): at 22:58

## 2024-03-11 RX ADMIN — Medication 650 MILLIGRAM(S): at 17:34

## 2024-03-11 RX ADMIN — Medication 1 DROP(S): at 17:32

## 2024-03-11 RX ADMIN — Medication 3 MILLILITER(S): at 12:14

## 2024-03-11 RX ADMIN — Medication 3 MILLILITER(S): at 17:31

## 2024-03-11 RX ADMIN — Medication 650 MILLIGRAM(S): at 18:30

## 2024-03-11 RX ADMIN — ONDANSETRON 4 MILLIGRAM(S): 8 TABLET, FILM COATED ORAL at 09:57

## 2024-03-11 RX ADMIN — PIPERACILLIN AND TAZOBACTAM 25 GRAM(S): 4; .5 INJECTION, POWDER, LYOPHILIZED, FOR SOLUTION INTRAVENOUS at 22:58

## 2024-03-11 RX ADMIN — Medication 1 DROP(S): at 08:06

## 2024-03-11 RX ADMIN — POTASSIUM PHOSPHATE, MONOBASIC POTASSIUM PHOSPHATE, DIBASIC 62.5 MILLIMOLE(S): 236; 224 INJECTION, SOLUTION INTRAVENOUS at 12:14

## 2024-03-11 RX ADMIN — ATORVASTATIN CALCIUM 20 MILLIGRAM(S): 80 TABLET, FILM COATED ORAL at 21:02

## 2024-03-11 NOTE — CONSULT NOTE ADULT - SUBJECTIVE AND OBJECTIVE BOX
Neurology Consult    Reason for Consult: Patient is a 83y old  Female who presents with a chief complaint of Urosepsis (11 Mar 2024 13:39)      HPI:  Pt is an 82yo F w/ PMH of HLD, macular degeneration, thalassemia minor and melanoma pw AMS and hypoxia.     Hx obtained from daughter at bedside along w/ patient.     Reports feeling well yesterday w/out acute symptoms. Today daughter was unable to reach pt so had neighbor perform wellness check at which point pt was found altered in her bed for which EMS was called.     Patient denies any acute symptoms such as F/C, CP, SOB, cough, nasal congestion/rhinorrhea, abd pain, N/V, constipation or diarrhea. Does wear depends when sleeping d/t incontinence but denies any dysuria. She was otherwise unable to recall the events that transpired this afternoon.     In the ED febrile, tachy w/ hypoxia (80% on RA per EMS) w/ lactic acidosis w/ positive UA and CXR w/ c/f PNA vs atelectasis s/p Vanc, Zosyn, 2L IVF and HFNC for hypoxia.           PAST MEDICAL & SURGICAL HISTORY:  HLD (hyperlipidemia)      Macular degeneration      H/O thalassemia minor      S/P cataract surgery      S/P hip replacement          Allergies: Allergies    No Known Allergies    Intolerances        Social History: Denies toxic habits including tobacco, ETOH or illicit drugs.    Family History: FAMILY HISTORY:  FH: glaucoma (Father)    Family history of thalassemia (Sibling)    . No family history of strokes    Medications: MEDICATIONS  (STANDING):  albuterol/ipratropium for Nebulization 3 milliLiter(s) Nebulizer every 6 hours  atorvastatin 20 milliGRAM(s) Oral at bedtime  chlorhexidine 2% Cloths 1 Application(s) Topical daily  enoxaparin Injectable 40 milliGRAM(s) SubCutaneous every 24 hours  latanoprost 0.005% Ophthalmic Solution 1 Drop(s) Both EYES at bedtime  piperacillin/tazobactam IVPB.. 3.375 Gram(s) IV Intermittent every 8 hours  timolol 0.5% Solution 1 Drop(s) Both EYES two times a day    MEDICATIONS  (PRN):  acetaminophen     Tablet .. 650 milliGRAM(s) Oral every 6 hours PRN Temp greater or equal to 38C (100.4F), Mild Pain (1 - 3)      Review of Systems: limited given mental status   CONSTITUTIONAL:  No weight loss, fever, chills, weakness or fatigue.  HEENT:  Eyes:  No visual loss, blurred vision, double vision or yellow sclera. Ears, Nose, Throat:  No hearing loss, sneezing, congestion, runny nose or sore throat.  SKIN:  No rash or itching.  CARDIOVASCULAR:  No chest pain, chest pressure or chest discomfort. No palpitations or edema.  RESPIRATORY:  No shortness of breath, cough or sputum.  GASTROINTESTINAL:  No anorexia, nausea, vomiting or diarrhea. No abdominal pain or blood.  GENITOURINARY:  No burning on urination or incontinence   NEUROLOGICAL:  No headache, dizziness, syncope, paralysis, ataxia, numbness or tingling in the extremities. No change in bowel or bladder control. no limb weakness. no vision changes.   MUSCULOSKELETAL:  No muscle, back pain, joint pain or stiffness.  HEMATOLOGIC:  No anemia, bleeding or bruising.  LYMPHATICS:  No enlarged nodes. No history of splenectomy.  PSYCHIATRIC:  No history of depression or anxiety.  ENDOCRINOLOGIC:  No reports of sweating, cold or heat intolerance. No polyuria or polydipsia.      Vitals:  Vital Signs Last 24 Hrs  T(C): 36.7 (11 Mar 2024 16:23), Max: 37 (10 Mar 2024 18:40)  T(F): 98 (11 Mar 2024 16:23), Max: 98.6 (10 Mar 2024 18:40)  HR: 90 (11 Mar 2024 16:23) (84 - 95)  BP: 129/79 (11 Mar 2024 16:23) (91/62 - 132/75)  BP(mean): 71 (11 Mar 2024 00:02) (67 - 73)  RR: 18 (11 Mar 2024 16:23) (16 - 24)  SpO2: 94% (11 Mar 2024 16:23) (93% - 98%)    Parameters below as of 11 Mar 2024 16:23  Patient On (Oxygen Delivery Method): nasal cannula  O2 Flow (L/min): 4      General Exam:   General Appearance: Appropriately dressed and in no acute distress       Head: Normocephalic, atraumatic and no dysmorphic features  Ear, Nose, and Throat: Moist mucous membranes  CVS: S1S2+  Resp: No SOB, no wheeze or rhonchi  GI: soft NT/ND  Extremities: No edema or cyanosis  Skin: No bruises or rashes     Neurological Exam:  Mental Status: Awake, alert and oriented x 1-2.  Able to follow simple  verbal commands. Able to name and repeat. fluent speech. No obvious aphasia mild dysarthria noted.   Cranial Nerves: PERRL, EOMI, decrase visual acuity bilateraly., sensation V1-V3 intact,  no obvious facial asymmetry, equal elevation of palate, scm/trap 5/5, tongue is midline on protrusion. no obvious papilledema on fundoscopic exam. hearing is grossly intact.   Motor: LACEY at least 4/5 throughout   Sensation: Intact to light touch   throughout.    Coordination: No dysmetria on FNF    Gait: cane/walker at baseline     Data/Labs/Imaging which I personally reviewed.     Labs:     CBC Full  -  ( 11 Mar 2024 06:58 )  WBC Count : 27.92 K/uL  RBC Count : 4.84 M/uL  Hemoglobin : 9.7 g/dL  Hematocrit : 31.0 %  Platelet Count - Automated : 138 K/uL  Mean Cell Volume : 64.0 fl  Mean Cell Hemoglobin : 20.0 pg  Mean Cell Hemoglobin Concentration : 31.3 gm/dL  Auto Neutrophil # : x  Auto Lymphocyte # : x  Auto Monocyte # : x  Auto Eosinophil # : x  Auto Basophil # : x  Auto Neutrophil % : x  Auto Lymphocyte % : x  Auto Monocyte % : x  Auto Eosinophil % : x  Auto Basophil % : x    03-11    140  |  107  |  33<H>  ----------------------------<  117<H>  4.1   |  20<L>  |  1.05    Ca    8.4      11 Mar 2024 06:58  Phos  3.7     03-11  Mg     1.6     03-11    TPro  6.7  /  Alb  4.0  /  TBili  1.2  /  DBili  x   /  AST  43<H>  /  ALT  43  /  AlkPhos  106  03-10    LIVER FUNCTIONS - ( 10 Mar 2024 14:30 )  Alb: 4.0 g/dL / Pro: 6.7 g/dL / ALK PHOS: 106 U/L / ALT: 43 U/L / AST: 43 U/L / GGT: x           PT/INR - ( 10 Mar 2024 14:30 )   PT: 12.0 sec;   INR: 1.15 ratio         PTT - ( 10 Mar 2024 14:30 )  PTT:32.6 sec  Urinalysis Basic - ( 11 Mar 2024 06:58 )    Color: x / Appearance: x / SG: x / pH: x  Gluc: 117 mg/dL / Ketone: x  / Bili: x / Urobili: x   Blood: x / Protein: x / Nitrite: x   Leuk Esterase: x / RBC: x / WBC x   Sq Epi: x / Non Sq Epi: x / Bacteria: x      < from: CT Head No Cont (03.10.24 @ 16:30) >    ACC: [id number] EXAM:  CT BRAIN   ORDERED BY: KO TIHBODEAUX     PROCEDURE DATE:  03/10/2024          INTERPRETATION:  HISTORY: Altered mental status.    COMPARISON: None available.    TECHNIQUE: Axial noncontrast CT images from the skull base to the vertex   were obtained and submitted for interpretation. Coronal and sagittal   reformatted images were performed. Bone and soft tissue windows were   evaluated.    FINDINGS:    There is no acute intracranial mass-effect, hemorrhage, midline shift, or  abnormal extra-axial fluid collection. Gray-white differentiation is   maintained.    Encephalomalacia and volume loss in the occipital lobes bilaterally.   Patchy and confluent periventricular and deep cerebral white matter   hypoattenuation due to chronic microangiopathic ischemic changes.   Atheromatous calcifications along the carotid siphons.    There is trace hyperattenuation along the left middle frontal cortex,   likely reflecting gyriform mineralization.    Moderate centrally predominant cerebral volume loss. No hydrocephalus.   Basal cisterns are patent.    Visualized paranasal sinuses and mastoid air cells are clear. Right   maria bullosa noted. Bilateral cataract surgery. Calvarium is intact.    IMPRESSION:    Trace hyperattenuation along the left middle frontal cortex, likely   reflecting gyriform mineralization. No other suspicion of acute   intracranial bleeding.    Bilateral occipital encephalomalacia.  Central volume loss and advanced microvascular ischemic changes.    --- End of Report ---            KAE TAYLOR MD; Attending Radiologist  This document has been electronically signed. Mar 10 2024  4:57PM    < end of copied text >

## 2024-03-11 NOTE — CONSULT NOTE ADULT - NS ATTEND AMEND GEN_ALL_CORE FT
ct with bibasilar atelectasis and ? infiltrate  will continue abx  continue duoneb q6h  incentive spirometry  keep sat>90% w o2 as needed, pt now on 4lnc with sat 93%

## 2024-03-11 NOTE — CONSULT NOTE ADULT - ASSESSMENT
83 year old presents with leukocytosis, confusion weakness. BC are positive for Ecoli with no resistance markers  Denies any focal complaints and abdominal exam is benign.  no UTI symptoms    E.coli sepsis  likely occult intraabd or  source     Denies focal complaints     discussed with family  continue zosyn as no resistance markers  awaiting ct scan and then we can decide on additional work up.

## 2024-03-11 NOTE — CONSULT NOTE ADULT - SUBJECTIVE AND OBJECTIVE BOX
PULMONARY CONSULT    HPI: 84 y/o F with PMH of HLD, macular degeneration, thalassemia minor and melanoma. Presents with AMS and hypoxia. Reportedly daughter was unable to reach pt, so had neighbor perform wellness check at which point, patient was found altered in her bed and EMS was called. Pt found to be febrile, tachycardic with hypoxia (to 80s on RA per EMS). Labs notable for leukocytosis, BC+ GNR. CXR with LLL opacity and UA+.          PAST MEDICAL & SURGICAL HISTORY:  HLD (hyperlipidemia)  Macular degeneration  H/O thalassemia minor  S/P cataract surgery  S/P hip replacement        Allergies  No Known Allergies    Intolerances      FAMILY HISTORY:  FH: glaucoma (Father)    Family history of thalassemia (Sibling)      Social history:     Review of Systems:  CONSTITUTIONAL: No fever, chills, or fatigue  EYES: No eye pain, visual disturbances, or discharge  ENMT:  No difficulty hearing, tinnitus, vertigo; No sinus or throat pain  NECK: No pain or stiffness  RESPIRATORY: Per above  CARDIOVASCULAR: No chest pain, palpitations, dizziness, or leg swelling  GASTROINTESTINAL: No abdominal or epigastric pain. No nausea, vomiting, or hematemesis; No diarrhea or constipation. No melena or hematochezia.  GENITOURINARY: No dysuria, frequency, hematuria, or incontinence  NEUROLOGICAL: No headaches, memory loss, loss of strength, numbness, or tremors  SKIN: No itching, burning, rashes, or lesions   MUSCULOSKELETAL: No joint pain or swelling; No muscle, back, or extremity pain  PSYCHIATRIC: No depression, anxiety, mood swings, or difficulty sleeping      Medications:  MEDICATIONS  (STANDING):  atorvastatin 20 milliGRAM(s) Oral at bedtime  enoxaparin Injectable 40 milliGRAM(s) SubCutaneous every 24 hours  latanoprost 0.005% Ophthalmic Solution 1 Drop(s) Both EYES at bedtime  piperacillin/tazobactam IVPB.. 3.375 Gram(s) IV Intermittent every 8 hours  potassium phosphate IVPB 15 milliMole(s) IV Intermittent once  timolol 0.5% Solution 1 Drop(s) Both EYES two times a day  vancomycin  IVPB 750 milliGRAM(s) IV Intermittent every 24 hours    MEDICATIONS  (PRN):  acetaminophen     Tablet .. 650 milliGRAM(s) Oral every 6 hours PRN Temp greater or equal to 38C (100.4F), Mild Pain (1 - 3)            Vital Signs Last 24 Hrs  T(C): 36.7 (11 Mar 2024 04:50), Max: 39.5 (10 Mar 2024 14:25)  T(F): 98.1 (11 Mar 2024 04:50), Max: 103.1 (10 Mar 2024 14:25)  HR: 91 (11 Mar 2024 04:50) (88 - 116)  BP: 117/71 (11 Mar 2024 04:50) (91/62 - 149/62)  BP(mean): 71 (11 Mar 2024 00:02) (67 - 73)  RR: 18 (11 Mar 2024 04:50) (16 - 26)  SpO2: 95% (11 Mar 2024 04:50) (94% - 98%)    Parameters below as of 11 Mar 2024 04:50  Patient On (Oxygen Delivery Method): nasal cannula  O2 Flow (L/min): 6        VBG pH 7.36 03-10 @ 18:37  VBG pCO2 35 03-10 @ 18:37  VBG O2 sat 60.2 03-10 @ 18:37  VBG lactate 3.9 03-10 @ 18:37  VBG pH 7.31 03-10 @ 17:05  VBG pCO2 27 03-10 @ 17:05  VBG O2 sat 72.0 03-10 @ 17:05  VBG lactate 2.3 03-10 @ 17:05  VBG pH 7.40 03-10 @ 14:21  VBG pCO2 38 03-10 @ 14:21  VBG O2 sat 55.4 03-10 @ 14:21  VBG lactate 3.2 03-10 @ 14:21            LABS:                        9.7    27.92 )-----------( 138      ( 11 Mar 2024 06:58 )             31.0     03-11    140  |  107  |  33<H>  ----------------------------<  117<H>  4.1   |  20<L>  |  1.05    Ca    8.4      11 Mar 2024 06:58  Phos  3.7     03-11  Mg     1.6     03-11    TPro  6.7  /  Alb  4.0  /  TBili  1.2  /  DBili  x   /  AST  43<H>  /  ALT  43  /  AlkPhos  106  03-10      Culture - Blood (collected 03-10-24 @ 14:25)  Source: .Blood Blood-Peripheral  Gram Stain (03-11-24 @ 04:31):    Growth in aerobic bottle: Gram Negative Rods    Growth in anaerobic bottle: Gram Negative Rods  Preliminary Report (03-11-24 @ 04:32):    Growth in aerobic bottle: Gram Negative Rods    Growth in anaerobic bottle: Gram Negative Rods    Culture - Blood (collected 03-10-24 @ 14:20)  Source: .Blood Blood-Peripheral  Gram Stain (03-11-24 @ 04:30):    Growth in aerobic bottle: Gram Negative Rods    Growth in anaerobic bottle: Gram Negative Rods  Preliminary Report (03-11-24 @ 04:31):    Growth in aerobic bottle: Gram Negative Rods    Growth in anaerobic bottle: Gram Negative Rods    Direct identification is available within approximately 3-5    hours either by Blood Panel Multiplexed PCR or Direct    MALDI-TOF. Details: [email or web address]  Organism: Blood Culture PCR (03-11-24 @ 04:52)  Organism: Blood Culture PCR (03-11-24 @ 04:52)      Method Type: PCR      -  Escherichia coli: Detec      CARDIAC MARKERS ( 10 Mar 2024 14:30 )  x     / x     / 41 U/L / x     / x          CAPILLARY BLOOD GLUCOSE      POCT Blood Glucose.: 130 mg/dL (10 Mar 2024 14:14)    PT/INR - ( 10 Mar 2024 14:30 )   PT: 12.0 sec;   INR: 1.15 ratio         PTT - ( 10 Mar 2024 14:30 )  PTT:32.6 sec  Urinalysis Basic - ( 11 Mar 2024 06:58 )    Color: x / Appearance: x / SG: x / pH: x  Gluc: 117 mg/dL / Ketone: x  / Bili: x / Urobili: x   Blood: x / Protein: x / Nitrite: x   Leuk Esterase: x / RBC: x / WBC x   Sq Epi: x / Non Sq Epi: x / Bacteria: x                    CULTURES:      (collected 03-10-24 @ 14:25)  Source: .Blood Blood-Peripheral  Gram Stain (03-11-24 @ 04:31):    Growth in aerobic bottle: Gram Negative Rods    Growth in anaerobic bottle: Gram Negative Rods  Preliminary Report (03-11-24 @ 04:32):    Growth in aerobic bottle: Gram Negative Rods    Growth in anaerobic bottle: Gram Negative Rods     (collected 03-10-24 @ 14:20)  Source: .Blood Blood-Peripheral  Gram Stain (03-11-24 @ 04:30):    Growth in aerobic bottle: Gram Negative Rods    Growth in anaerobic bottle: Gram Negative Rods  Preliminary Report (03-11-24 @ 04:31):    Growth in aerobic bottle: Gram Negative Rods    Growth in anaerobic bottle: Gram Negative Rods    Direct identification is available within approximately 3-5    hours either by Blood Panel Multiplexed PCR or Direct    MALDI-TOF. Details: [email or web address]  Organism: Blood Culture PCR (03-11-24 @ 04:52)  Organism: Blood Culture PCR (03-11-24 @ 04:52)      Method Type: PCR      -  Escherichia coli: Detec                      Physical Examination:    General: No acute distress.      HEENT: Pupils equal, reactive to light.  Symmetric.    PULM: Clear to auscultation bilaterally, no significant sputum production    CVS: S1, S2    ABD: Soft, nondistended, nontender, normoactive bowel sounds, no masses    EXT: No edema, nontender    SKIN: Warm and well perfused, no rashes noted.    NEURO: Alert, oriented, interactive, nonfocal        RADIOLOGY REVIEWED  CXR: LLL opacity    PULMONARY CONSULT    HPI: 82 y/o F with PMH of HLD, macular degeneration, thalassemia minor and melanoma. Presents with AMS and hypoxia. Reportedly daughter was unable to reach pt, so had neighbor perform wellness check at which point, patient was found altered in her bed and EMS was called. Pt found to be febrile, tachycardic with hypoxia (to 80s on RA per EMS). Labs notable for leukocytosis, BC+ GNR. CXR with LLL opacity and UA+. Initially requiring HFNC, now on nasal cannula. Per daughter at bedside, pt with mild cough several days ago with c/o mild discomfort L lateral chest. Denies SOB.           PAST MEDICAL & SURGICAL HISTORY:  HLD (hyperlipidemia)  Macular degeneration  H/O thalassemia minor  S/P cataract surgery  S/P hip replacement        Allergies  No Known Allergies        FAMILY HISTORY:  FH: glaucoma (Father)    Family history of thalassemia (Sibling)      Social history: never a smoker     Review of Systems:  CONSTITUTIONAL: Per above   EYES: No eye pain, visual disturbances, or discharge  ENMT:  No difficulty hearing, tinnitus, vertigo; No sinus or throat pain  NECK: No pain or stiffness  RESPIRATORY: Per above  CARDIOVASCULAR: No chest pain, palpitations, dizziness, or leg swelling  GASTROINTESTINAL: No abdominal or epigastric pain. No nausea, vomiting, or hematemesis; No diarrhea or constipation. No melena or hematochezia.  GENITOURINARY: No dysuria, frequency, hematuria, or incontinence  NEUROLOGICAL: No headaches, memory loss, loss of strength, numbness, or tremors  SKIN: No itching, burning, rashes, or lesions   MUSCULOSKELETAL: No joint pain or swelling; No muscle, back, or extremity pain  PSYCHIATRIC: No depression, anxiety, mood swings, or difficulty sleeping      Medications:  MEDICATIONS  (STANDING):  atorvastatin 20 milliGRAM(s) Oral at bedtime  enoxaparin Injectable 40 milliGRAM(s) SubCutaneous every 24 hours  latanoprost 0.005% Ophthalmic Solution 1 Drop(s) Both EYES at bedtime  piperacillin/tazobactam IVPB.. 3.375 Gram(s) IV Intermittent every 8 hours  potassium phosphate IVPB 15 milliMole(s) IV Intermittent once  timolol 0.5% Solution 1 Drop(s) Both EYES two times a day  vancomycin  IVPB 750 milliGRAM(s) IV Intermittent every 24 hours    MEDICATIONS  (PRN):  acetaminophen     Tablet .. 650 milliGRAM(s) Oral every 6 hours PRN Temp greater or equal to 38C (100.4F), Mild Pain (1 - 3)            Vital Signs Last 24 Hrs  T(C): 36.7 (11 Mar 2024 04:50), Max: 39.5 (10 Mar 2024 14:25)  T(F): 98.1 (11 Mar 2024 04:50), Max: 103.1 (10 Mar 2024 14:25)  HR: 91 (11 Mar 2024 04:50) (88 - 116)  BP: 117/71 (11 Mar 2024 04:50) (91/62 - 149/62)  BP(mean): 71 (11 Mar 2024 00:02) (67 - 73)  RR: 18 (11 Mar 2024 04:50) (16 - 26)  SpO2: 95% (11 Mar 2024 04:50) (94% - 98%)    Parameters below as of 11 Mar 2024 04:50  Patient On (Oxygen Delivery Method): nasal cannula  O2 Flow (L/min): 6        VBG pH 7.36 03-10 @ 18:37  VBG pCO2 35 03-10 @ 18:37  VBG O2 sat 60.2 03-10 @ 18:37  VBG lactate 3.9 03-10 @ 18:37  VBG pH 7.31 03-10 @ 17:05  VBG pCO2 27 03-10 @ 17:05  VBG O2 sat 72.0 03-10 @ 17:05  VBG lactate 2.3 03-10 @ 17:05  VBG pH 7.40 03-10 @ 14:21  VBG pCO2 38 03-10 @ 14:21  VBG O2 sat 55.4 03-10 @ 14:21  VBG lactate 3.2 03-10 @ 14:21            LABS:                        9.7    27.92 )-----------( 138      ( 11 Mar 2024 06:58 )             31.0     03-11    140  |  107  |  33<H>  ----------------------------<  117<H>  4.1   |  20<L>  |  1.05    Ca    8.4      11 Mar 2024 06:58  Phos  3.7     03-11  Mg     1.6     03-11    TPro  6.7  /  Alb  4.0  /  TBili  1.2  /  DBili  x   /  AST  43<H>  /  ALT  43  /  AlkPhos  106  03-10      Culture - Blood (collected 03-10-24 @ 14:25)  Source: .Blood Blood-Peripheral  Gram Stain (03-11-24 @ 04:31):    Growth in aerobic bottle: Gram Negative Rods    Growth in anaerobic bottle: Gram Negative Rods  Preliminary Report (03-11-24 @ 04:32):    Growth in aerobic bottle: Gram Negative Rods    Growth in anaerobic bottle: Gram Negative Rods    Culture - Blood (collected 03-10-24 @ 14:20)  Source: .Blood Blood-Peripheral  Gram Stain (03-11-24 @ 04:30):    Growth in aerobic bottle: Gram Negative Rods    Growth in anaerobic bottle: Gram Negative Rods  Preliminary Report (03-11-24 @ 04:31):    Growth in aerobic bottle: Gram Negative Rods    Growth in anaerobic bottle: Gram Negative Rods    Direct identification is available within approximately 3-5    hours either by Blood Panel Multiplexed PCR or Direct    MALDI-TOF. Details: [email or web address]  Organism: Blood Culture PCR (03-11-24 @ 04:52)  Organism: Blood Culture PCR (03-11-24 @ 04:52)      Method Type: PCR      -  Escherichia coli: Detec      CARDIAC MARKERS ( 10 Mar 2024 14:30 )  x     / x     / 41 U/L / x     / x          CAPILLARY BLOOD GLUCOSE      POCT Blood Glucose.: 130 mg/dL (10 Mar 2024 14:14)    PT/INR - ( 10 Mar 2024 14:30 )   PT: 12.0 sec;   INR: 1.15 ratio         PTT - ( 10 Mar 2024 14:30 )  PTT:32.6 sec  Urinalysis Basic - ( 11 Mar 2024 06:58 )    Color: x / Appearance: x / SG: x / pH: x  Gluc: 117 mg/dL / Ketone: x  / Bili: x / Urobili: x   Blood: x / Protein: x / Nitrite: x   Leuk Esterase: x / RBC: x / WBC x   Sq Epi: x / Non Sq Epi: x / Bacteria: x                    CULTURES:      (collected 03-10-24 @ 14:25)  Source: .Blood Blood-Peripheral  Gram Stain (03-11-24 @ 04:31):    Growth in aerobic bottle: Gram Negative Rods    Growth in anaerobic bottle: Gram Negative Rods  Preliminary Report (03-11-24 @ 04:32):    Growth in aerobic bottle: Gram Negative Rods    Growth in anaerobic bottle: Gram Negative Rods     (collected 03-10-24 @ 14:20)  Source: .Blood Blood-Peripheral  Gram Stain (03-11-24 @ 04:30):    Growth in aerobic bottle: Gram Negative Rods    Growth in anaerobic bottle: Gram Negative Rods  Preliminary Report (03-11-24 @ 04:31):    Growth in aerobic bottle: Gram Negative Rods    Growth in anaerobic bottle: Gram Negative Rods    Direct identification is available within approximately 3-5    hours either by Blood Panel Multiplexed PCR or Direct    MALDI-TOF. Details: [email or web address]  Organism: Blood Culture PCR (03-11-24 @ 04:52)  Organism: Blood Culture PCR (03-11-24 @ 04:52)      Method Type: PCR      -  Escherichia coli: Detec                      Physical Examination:    General: No acute distress.      HEENT: Pupils equal, reactive to light.  Symmetric.    PULM: bibasilar crackles     CVS: S1, S2    ABD: Soft, nondistended, nontender, normoactive bowel sounds, no masses    EXT: No edema, nontender    SKIN: Warm and well perfused, no rashes noted.    NEURO: A&O x 2-3        RADIOLOGY REVIEWED  CXR: LLL opacity

## 2024-03-11 NOTE — CONSULT NOTE ADULT - PROBLEM SELECTOR PROBLEM 2
Statement Selected
UTI (urinary tract infection)
Statement Selected

## 2024-03-11 NOTE — PATIENT PROFILE ADULT - HISTORY OF COVID-19 VACCINATION
Yes
Hx of HTN on hydralazine 25mg bid, amlodipine 10mg qd, metoprolol succinate 100mg qd, losartan 100mg qd, spironolactone 25mg qd.  BP currently 140s/70s.  - start hydral 25mg qd  - restart other meds accordingly

## 2024-03-11 NOTE — CONSULT NOTE ADULT - ASSESSMENT
82yo F w/ HLD, macular degeneration, thalassemia minor and melanoma pw AMS and hypoxia found to have sepsis 2/2 UTI.  neuro called for AMS and abnormal CTH   In the ED febrile, tachy w/ hypoxia (80% on RA per EMS) w/ lactic acidosis w/ positive UA and CXR w/ c/f PNA vs atelectasis s/p Vanc, Zosyn, 2L IVF and HFNC for hypoxia.   + UA  Ucx with E. coli   blood cx with + GNR   + leukocytosis   CTH with L middle frontal hyperattenuation likely mineralization.  Bilateral occipital encephalomalacia   CT chest with B/L lower lobe opacities c/f atelectasis and/or PNA   spoke with daughter in ED     Imprssion:   1) AMS likely toxic metabolic encephalopahty from sepsis 2/2 UTI   2) abnormal CTH.  bilateral occipital lobe encephalomalacia --> unclear etiology no known h/o strokes.  also frontal hyperattenuation likely mineralization     - zosyn for infection  - on atorvastatin 20 QHS   - at some point non urgent would get MRI brain for further clarification of occiptial findings.  given encephalomalacia it seems chronic but possible old strokes.   - would start ASA 81mg daily for now based on CTH findings if no objection  - f/u ID    - CT abd/plevis pending   - Hemoglobin A1c and lipid panel  - b12, TSH if not already checked   - PT/OT/SS/SLP, OOBC  - check FS, glucose control <180  - GI/DVT ppx  - Counseling on diet, exercise, and medication adherence was done  - Counseling on smoking cessation and alcohol consumption offered when appropriate.  - Pain assessed and judicious use of narcotics when appropriate was discussed.    - Stroke education given when appropriate.  - Importance of fall prevention discussed.   - Differential diagnosis and plan of care discussed with patient and/or family and primary team  - Thank you for allowing me to participate in the care of this patient. Call with questions.   - spoke with daughter in ED    Elias Leggett MD  Vascular Neurology  Office: [phone]

## 2024-03-11 NOTE — CONSULT NOTE ADULT - PROBLEM SELECTOR RECOMMENDATION 9
CXR with LLL opacity  -Initially requiring HFNC, now on nasal cannula  -O2 lowered from 6LNC to 4LNC. Continue to wean o2 as tolerated, keep sats >90%.  -Duoneb q6h ordered  -F/u MRSA/MSSA nasal PCR   -CT chest non contrast ordered   -Continue abx

## 2024-03-11 NOTE — PATIENT PROFILE ADULT - NSPRESCRALCAMT_GEN_A_NUR
SCRIBE #1 NOTE: I, Mindy Tomlin, am scribing for, and in the presence of, Anita King MD. I have scribed the entire note.      History      Chief Complaint   Patient presents with    Shortness of Breath     Pt with known PMH of CHF and COPD, recently released from [facility] with COPD exacerbation, became increasingly SOB tonight       Review of patient's allergies indicates:  No Known Allergies     HPI   HPI    6/6/2017, 2:20 AM   History obtained from the patient      History of Present Illness: Laila Nieto is a 62 y.o. female patient with PMHx of CHF and COPD who presents to the Emergency Department for SOB which onset gradually 2 days ago. Symptoms are constant and moderate in severity. Pt reports she was admitted to [facility] 1 week ago for COPD exacerbation. States her SOB gradually worsened over the past few hours. No mitigating or exacerbating factors reported. Associated sxs include tailbone pain. Pt also reports she had a fall earlier tonight and fell on her bottom. Patient denies any LOC, head injury/trauma, HA, fever, chills, CP, N/V/D, dysuria, weakness/numbness, dizziness, and all other sxs at this time. No further complaints or concerns at this time.       Arrival mode: EMS      PCP: Noel Winchester MD       Past Medical History:  Past Medical History:   Diagnosis Date    Anxiety     Asthma     CHF (congestive heart failure)     Chronic pain     Back and Shoulder    COPD (chronic obstructive pulmonary disease)     Diabetes     Hepatitis C     High cholesterol     Hypertension        Past Surgical History:  Past Surgical History:   Procedure Laterality Date    APPENDECTOMY      BACK SURGERY      CHOLECYSTECTOMY      ROTATOR CUFF REPAIR           Family History:  History reviewed. No pertinent family history.    Social History:  Social History     Social History Main Topics    Smoking status: Former Smoker     Packs/day: 1.00     Years: 40.00     Quit date: 2/16/2014    Smokeless tobacco: Not  given    Alcohol use Yes    Drug use: No    Sexual activity: Not given       ROS   Review of Systems   Constitutional: Negative for chills and fever.   HENT: Negative for sore throat.    Respiratory: Positive for shortness of breath.    Cardiovascular: Negative for chest pain.   Gastrointestinal: Negative for diarrhea, nausea and vomiting.   Genitourinary: Negative for dysuria.   Musculoskeletal: Negative for back pain.        (+) tailbone pain   Skin: Negative for rash.   Neurological: Negative for dizziness, syncope, weakness, numbness and headaches.        (-) head injury/trauma   Hematological: Does not bruise/bleed easily.   All other systems reviewed and are negative.    Physical Exam      Initial Vitals [06/06/17 0218]   BP Pulse Resp Temp SpO2   94/71 (!) 18 (!) 27 -- 100 %     Physical Exam  Nursing Notes and Vital Signs Reviewed.  Constitutional: Patient is in no acute distress. Well-developed and well-nourished.   Head: Atraumatic. Normocephalic.  Eyes: PERRL. EOM intact. Conjunctivae are not pale. No scleral icterus.  ENT: Mucous membranes are moist. Oropharynx is clear and symmetric.    Neck: Supple. Full ROM. No lymphadenopathy.  Cardiovascular: Regular rate. Regular rhythm. No murmurs, rubs, or gallops. Distal pulses are 2+ and symmetric.  Pulmonary/Chest: No respiratory distress. Tachypnic. Decreased breath sounds. No wheezing, rales, or rhonchi.  Abdominal: Soft and non-distended.  There is no tenderness.  No rebound, guarding, or rigidity. Good bowel sounds.  Genitourinary: No CVA tenderness  Musculoskeletal: Moves all extremities. No obvious deformities. 2+ edema to BLE. No calf tenderness.  Skin: Warm and dry.  Neurological:  Alert, awake, and appropriate.  Normal speech.  No acute focal neurological deficits are appreciated.  Psychiatric: Normal affect. Good eye contact. Appropriate in content.    ED Course    Procedures  ED Vital Signs:  Vitals:    06/06/17 0222 06/06/17 0236 06/06/17 0249  06/06/17 0337   BP:  (!) 136/55 (!) 146/55 (!) 145/66   Pulse: 94 96 91 90   Resp:  20 20 (!) 22   Temp:       TempSrc:       SpO2:  96% 96% 98%   Weight:       Height:        06/06/17 0349 06/06/17 0404 06/06/17 0450 06/06/17 0518   BP: 138/79 (!) 143/59 (!) 174/92    Pulse: 88 87 86    Resp: 20 18 (!) 21    Temp:    98.4 °F (36.9 °C)   TempSrc:    Oral   SpO2: 97% 97% 97%    Weight:       Height:        06/06/17 0553 06/06/17 0652 06/06/17 0729 06/06/17 0732   BP: (!) 129/56 (!) 139/57  126/62   Pulse: 86 88 89 90   Resp: (!) 22 20 18 17   Temp: 97.6 °F (36.4 °C)      TempSrc:       SpO2: 97% 95% (!) 92% (!) 92%   Weight:       Height:        06/06/17 0752 06/06/17 0818 06/06/17 0921   BP:  123/68 121/80   Pulse:  92 87   Resp:  17 20   Temp: 98.3 °F (36.8 °C)     TempSrc: Oral     SpO2:  96% 95%   Weight:      Height:          Abnormal Lab Results:  Labs Reviewed   CBC W/ AUTO DIFFERENTIAL - Abnormal; Notable for the following:        Result Value    WBC 12.88 (*)     RBC 3.28 (*)     Hemoglobin 8.1 (*)     Hematocrit 28.1 (*)     MCH 24.7 (*)     MCHC 28.8 (*)     RDW 16.7 (*)     Gran # 9.5 (*)     Mono # 1.3 (*)     Gran% 73.9 (*)     Lymph% 15.3 (*)     All other components within normal limits   COMPREHENSIVE METABOLIC PANEL - Abnormal; Notable for the following:     BUN, Bld 33 (*)     Calcium 8.5 (*)     Albumin 3.0 (*)     Alkaline Phosphatase 157 (*)     AST 50 (*)     ALT 84 (*)     eGFR if non  54 (*)     All other components within normal limits   PROTIME-INR - Abnormal; Notable for the following:     Prothrombin Time 12.8 (*)     All other components within normal limits   B-TYPE NATRIURETIC PEPTIDE - Abnormal; Notable for the following:     BNP 1,690 (*)     All other components within normal limits   TROPONIN I - Abnormal; Notable for the following:     Troponin I 0.081 (*)     All other components within normal limits   TROPONIN I - Abnormal; Notable for the following:     Troponin  I 0.084 (*)     All other components within normal limits   ISTAT PROCEDURE - Abnormal; Notable for the following:     POC PCO2 47.4 (*)     POC PO2 175 (*)     POC HCO3 31.6 (*)     All other components within normal limits   APTT   CK-MB   CK   LACTIC ACID, PLASMA   PHOSPHORUS   MAGNESIUM   TYPE & SCREEN        All Lab Results:  Results for orders placed or performed during the hospital encounter of 06/06/17   CBC auto differential   Result Value Ref Range    WBC 12.88 (H) 3.90 - 12.70 K/uL    RBC 3.28 (L) 4.00 - 5.40 M/uL    Hemoglobin 8.1 (L) 12.0 - 16.0 g/dL    Hematocrit 28.1 (L) 37.0 - 48.5 %    MCV 86 82 - 98 fL    MCH 24.7 (L) 27.0 - 31.0 pg    MCHC 28.8 (L) 32.0 - 36.0 %    RDW 16.7 (H) 11.5 - 14.5 %    Platelets 223 150 - 350 K/uL    MPV 10.1 9.2 - 12.9 fL    Gran # 9.5 (H) 1.8 - 7.7 K/uL    Lymph # 2.0 1.0 - 4.8 K/uL    Mono # 1.3 (H) 0.3 - 1.0 K/uL    Eos # 0.1 0.0 - 0.5 K/uL    Baso # 0.02 0.00 - 0.20 K/uL    Gran% 73.9 (H) 38.0 - 73.0 %    Lymph% 15.3 (L) 18.0 - 48.0 %    Mono% 9.9 4.0 - 15.0 %    Eosinophil% 0.7 0.0 - 8.0 %    Basophil% 0.2 0.0 - 1.9 %    Differential Method Automated    Comprehensive metabolic panel   Result Value Ref Range    Sodium 141 136 - 145 mmol/L    Potassium 4.0 3.5 - 5.1 mmol/L    Chloride 104 95 - 110 mmol/L    CO2 27 23 - 29 mmol/L    Glucose 74 70 - 110 mg/dL    BUN, Bld 33 (H) 8 - 23 mg/dL    Creatinine 1.1 0.5 - 1.4 mg/dL    Calcium 8.5 (L) 8.7 - 10.5 mg/dL    Total Protein 6.2 6.0 - 8.4 g/dL    Albumin 3.0 (L) 3.5 - 5.2 g/dL    Total Bilirubin 0.7 0.1 - 1.0 mg/dL    Alkaline Phosphatase 157 (H) 55 - 135 U/L    AST 50 (H) 10 - 40 U/L    ALT 84 (H) 10 - 44 U/L    Anion Gap 10 8 - 16 mmol/L    eGFR if African American >60 >60 mL/min/1.73 m^2    eGFR if non African American 54 (A) >60 mL/min/1.73 m^2   Protime-INR   Result Value Ref Range    Prothrombin Time 12.8 (H) 9.0 - 12.5 sec    INR 1.2 0.8 - 1.2   APTT   Result Value Ref Range    aPTT <21.0 21.0 - 32.0 sec   CK-MB    Result Value Ref Range    CPK 59 20 - 180 U/L    CPK MB 1.2 0.1 - 6.5 ng/mL    MB% 2.0 0.0 - 5.0 %   CPK   Result Value Ref Range    CPK 59 20 - 180 U/L   Brain natriuretic peptide   Result Value Ref Range    BNP 1,690 (H) 0 - 99 pg/mL   Lactic acid, plasma   Result Value Ref Range    Lactate (Lactic Acid) 0.8 0.5 - 2.2 mmol/L   Phosphorus   Result Value Ref Range    Phosphorus 4.4 2.7 - 4.5 mg/dL   Magnesium   Result Value Ref Range    Magnesium 2.5 1.6 - 2.6 mg/dL   Troponin I   Result Value Ref Range    Troponin I 0.081 (H) 0.000 - 0.026 ng/mL   Troponin I   Result Value Ref Range    Troponin I 0.084 (H) 0.000 - 0.026 ng/mL   Type & Screen   Result Value Ref Range    Group & Rh O NEG     Indirect Levon NEG    ISTAT PROCEDURE   Result Value Ref Range    POC PH 7.432 7.35 - 7.45    POC PCO2 47.4 (H) 35 - 45 mmHg    POC PO2 175 (H) 80 - 100 mmHg    POC HCO3 31.6 (H) 24 - 28 mmol/L    POC BE 7 -2 to 2 mmol/L    POC SATURATED O2 100 95 - 100 %    Sample ARTERIAL     Site LR     Allens Test Pass     DelSys CPAP/BiPAP     Mode BiPAP     FiO2 40     IP 14     EP 5          Imaging Results:  Imaging Results          X-Ray Chest AP Portable (Final result)  Result time 06/06/17 07:11:27    Final result by Pedro Luis Christianson MD (06/06/17 07:11:27)                 Impression:     No acute infiltrates.            Electronically signed by: PEDRO LUIS CHRISTIANSON MD  Date:     06/06/17  Time:    07:11              Narrative:    Exam: XR CHEST AP PORTABLE    Clinical History: Shortness of breath.     Findings:     The lungs are clear. Mild cardiomegaly.  Lung hyperinflation.                             Per ED physician, pt's CXR results show cardiomegaly and pulmonary congestion.      The EKG was ordered, reviewed, and independently interpreted by the ED provider.  Interpretation time: 0429  Rate: 86 BPM  Rhythm: sinus rhythm with short [place]  Interpretation: No acute ST changes. No STEMI.         The Emergency Provider reviewed the vital signs  and test results, which are outlined above.    ED Discussion     6:25 AM: Reassessed pt at this time. Pt is AAO x3, in NAD, and VSS. Pt states her condition has improved at this time. Discussed with pt all pertinent ED information and results. Discussed pt dx and plan of tx. Gave pt all f/u and return to the ED instructions. All questions and concerns were addressed at this time. Pt expresses understanding of information and instructions, and is comfortable with plan to discharge. Pt is stable for discharge.    I discussed with patient and/or family/caretaker that evaluation in the ED does not suggest any emergent or life threatening medical conditions requiring immediate intervention beyond what was provided in the ED, and I believe patient is safe for discharge.  Regardless, an unremarkable evaluation in the ED does not preclude the development or presence of a serious of life threatening condition. As such, patient was instructed to return immediately for any worsening or change in current symptoms.    ED Medication(s):  Medications   methylPREDNISolone sod suc(PF) injection 125 mg (125 mg Intravenous Given 6/6/17 0225)   acetaminophen (10 mg/mL) injection 1,000 mg ( Intravenous Canceled Entry 6/6/17 0345)   furosemide injection 40 mg (40 mg Intravenous Given 6/6/17 0306)   acetaminophen tablet 1,000 mg (1,000 mg Oral Given 6/6/17 0926)       Discharge Medication List as of 6/6/2017  9:16 AM          Follow-up Information     Noel Winchester MD in 2 days.    Specialty:  General Practice  Contact information:  [address] [facility] [id number] [phone]             [facility] - .    Specialty:  Emergency Medicine  Why:  As needed, If symptoms worsen  Contact information:  [address] [facility] [address] [phone]                   Medical Decision Making    Medical Decision Making:   Clinical Tests:   Lab Tests: Ordered and Reviewed  Radiological Study: Ordered and  Reviewed  Medical Tests: Ordered and Reviewed           Scribe Attestation:   Scribe #1: I performed the above scribed service and the documentation accurately describes the services I performed. I attest to the accuracy of the note.    Attending:   Physician Attestation Statement for Scribe #1: I, Anita King MD, personally performed the services described in this documentation, as scribed by Mindy Tomlin, in my presence, and it is both accurate and complete.          Clinical Impression       ICD-10-CM ICD-9-CM   1. Acute on chronic congestive heart failure, unspecified congestive heart failure type I50.9 428.0   2. SOB (shortness of breath) R06.02 786.05       Disposition:   Disposition: Discharged  Condition: Stable         Anita King MD  06/07/17 0117     1 or 2

## 2024-03-11 NOTE — CONSULT NOTE ADULT - ASSESSMENT
84 y/o F with PMH of HLD, macular degeneration, thalassemia minor and melanoma. Presents with AMS and hypoxia. Pt found to be febrile, tachycardic with hypoxia (to 80s on RA per EMS). Labs notable for leukocytosis, BC+ GNR. CXR with LLL opacity and UA+.

## 2024-03-11 NOTE — PATIENT PROFILE ADULT - FALL HARM RISK - HARM RISK INTERVENTIONS
Assistance with ambulation/Assistance OOB with selected safe patient handling equipment/Communicate Risk of Fall with Harm to all staff/Discuss with provider need for PT consult/Monitor gait and stability/Reinforce activity limits and safety measures with patient and family/Tailored Fall Risk Interventions/Visual Cue: Yellow wristband and red socks/Bed in lowest position, wheels locked, appropriate side rails in place/Call bell, personal items and telephone in reach/Instruct patient to call for assistance before getting out of bed or chair/Non-slip footwear when patient is out of bed/[place] to call system/Physically safe environment - no spills, clutter or unnecessary equipment/Purposeful Proactive Rounding/Room/bathroom lighting operational, light cord in reach

## 2024-03-11 NOTE — CONSULT NOTE ADULT - SUBJECTIVE AND OBJECTIVE BOX
Patient is a 83y old  Female who presents with a chief complaint of Urosepsis (11 Mar 2024 12:45)    HPI:  Pt is an 82yo F w/ PMH of HLD, macular degeneration, thalassemia minor and melanoma pw AMS and hypoxia.     Hx obtained from daughter at bedside along w/ patient.     Reports feeling well yesterday w/out acute symptoms. Today daughter was unable to reach pt so had neighbor perform wellness check at which point pt was found altered in her bed for which EMS was called.     Patient denies any acute symptoms such as F/C, CP, SOB, cough, nasal congestion/rhinorrhea, abd pain, N/V, constipation or diarrhea. Does wear depends when sleeping d/t incontinence but denies any dysuria. She was otherwise unable to recall the events that transpired this afternoon.     In the ED febrile, tachy w/ hypoxia (80% on RA per EMS) w/ lactic acidosis w/ positive UA and CXR w/ c/f PNA vs atelectasis s/p Vanc, Zosyn, 2L IVF and HFNC for hypoxia.    (10 Mar 2024 22:10)       Genitourinary:	No dysuria,frequency. No flank pain         Allergic/Immunologic:	No hives or rash   Allergies    No Known Allergies    Intolerances        Antimicrobials:    piperacillin/tazobactam IVPB.. 3.375 Gram(s) IV Intermittent every 8 hours        Vital Signs Last 24 Hrs  T(C): 36.3 (11 Mar 2024 11:30), Max: 39.5 (10 Mar 2024 14:25)  T(F): 97.3 (11 Mar 2024 11:30), Max: 103.1 (10 Mar 2024 14:25)  HR: 87 (11 Mar 2024 11:30) (87 - 116)  BP: 115/70 (11 Mar 2024 11:30) (91/62 - 149/62)  BP(mean): 71 (11 Mar 2024 00:02) (67 - 73)  RR: 18 (11 Mar 2024 11:30) (16 - 26)  SpO2: 93% (11 Mar 2024 11:30) (93% - 98%)    Parameters below as of 11 Mar 2024 11:30  Patient On (Oxygen Delivery Method): nasal cannula  O2 Flow (L/min): 6           Eyes:PERRL EOMI.NO discharge or conjunctival injection    ENMT:No sinus tenderness.No thrush.No pharyngeal exudate or erythema.Fair dental hygiene    Neck:Supple,No LN,no JVD      Respiratory:Good air entry bilaterally,CTA    Cardiovascular:S1 S2 wnl, No murmurs,rub or gallops    Gastrointestinal:Soft BS(+) no tenderness no masses ,No rebound or guarding         Skin:No rash     Lymph Nodes:No palpable LNs    Musculoskeletal:No joint swelling or LOM    Psychiatric:Affect normal.                                9.7    27.92 )-----------( 138      ( 11 Mar 2024 06:58 )             31.0         03-11    140  |  107  |  33<H>  ----------------------------<  117<H>  4.1   |  20<L>  |  1.05    Ca    8.4      11 Mar 2024 06:58  Phos  3.7     03-11  Mg     1.6     03-11    TPro  6.7  /  Alb  4.0  /  TBili  1.2  /  DBili  x   /  AST  43<H>  /  ALT  43  /  AlkPhos  106  03-10      RECENT CULTURES:  03-10 @ 14:25  .Blood Blood-Peripheral  --  --  --    Growth in aerobic bottle: Gram Negative Rods  Growth in anaerobic bottle: Gram Negative Rods  --  03-10 @ 14:20  .Blood Blood-Peripheral  Blood Culture PCR  Blood Culture PCR  PCR    Growth in aerobic bottle: Gram Negative Rods  Growth in anaerobic bottle: Gram Negative Rods  Direct identification is available within approximately 3-5  hours either by Blood Panel Multiplexed PCR or Direct  MALDI-TOF. Details: [email or web address]  --      MICROBIOLOGY:  Culture Results:   Growth in aerobic bottle: Gram Negative Rods  Growth in anaerobic bottle: Gram Negative Rods (03-10 @ 14:25)  Culture Results:   Growth in aerobic bottle: Gram Negative Rods  Growth in anaerobic bottle: Gram Negative Rods  Direct identification is available within approximately 3-5  hours either by Blood Panel Multiplexed PCR or Direct  MALDI-TOF. Details: [email or web address] (03-10 @ 14:20)          Radiology:      Assessment:        Recommendations and Plan:    Pager [phone]  After 5 pm/weekends or if no response :[phone]

## 2024-03-12 LAB
-  AMPICILLIN/SULBACTAM: SIGNIFICANT CHANGE UP
-  AMPICILLIN: SIGNIFICANT CHANGE UP
-  AZTREONAM: SIGNIFICANT CHANGE UP
-  CEFAZOLIN: SIGNIFICANT CHANGE UP
-  CEFEPIME: SIGNIFICANT CHANGE UP
-  CEFOXITIN: SIGNIFICANT CHANGE UP
-  CEFTRIAXONE: SIGNIFICANT CHANGE UP
-  CIPROFLOXACIN: SIGNIFICANT CHANGE UP
-  ERTAPENEM: SIGNIFICANT CHANGE UP
-  GENTAMICIN: SIGNIFICANT CHANGE UP
-  IMIPENEM: SIGNIFICANT CHANGE UP
-  LEVOFLOXACIN: SIGNIFICANT CHANGE UP
-  MEROPENEM: SIGNIFICANT CHANGE UP
-  PIPERACILLIN/TAZOBACTAM: SIGNIFICANT CHANGE UP
-  TOBRAMYCIN: SIGNIFICANT CHANGE UP
-  TRIMETHOPRIM/SULFAMETHOXAZOLE: SIGNIFICANT CHANGE UP
ACANTHOCYTES BLD QL SMEAR: SLIGHT — SIGNIFICANT CHANGE UP
ALBUMIN SERPL ELPH-MCNC: 3.2 G/DL — LOW (ref 3.3–5)
ALP SERPL-CCNC: 123 U/L — HIGH (ref 40–120)
ALT FLD-CCNC: 25 U/L — SIGNIFICANT CHANGE UP (ref 10–45)
ANION GAP SERPL CALC-SCNC: 14 MMOL/L — SIGNIFICANT CHANGE UP (ref 5–17)
ANISOCYTOSIS BLD QL: SLIGHT — SIGNIFICANT CHANGE UP
AST SERPL-CCNC: 25 U/L — SIGNIFICANT CHANGE UP (ref 10–40)
BASOPHILS # BLD AUTO: 0 K/UL — SIGNIFICANT CHANGE UP (ref 0–0.2)
BASOPHILS NFR BLD AUTO: 0 % — SIGNIFICANT CHANGE UP (ref 0–2)
BILIRUB SERPL-MCNC: 0.7 MG/DL — SIGNIFICANT CHANGE UP (ref 0.2–1.2)
BUN SERPL-MCNC: 29 MG/DL — HIGH (ref 7–23)
BURR CELLS BLD QL SMEAR: PRESENT — SIGNIFICANT CHANGE UP
CALCIUM SERPL-MCNC: 8.7 MG/DL — SIGNIFICANT CHANGE UP (ref 8.4–10.5)
CHLORIDE SERPL-SCNC: 106 MMOL/L — SIGNIFICANT CHANGE UP (ref 96–108)
CO2 SERPL-SCNC: 21 MMOL/L — LOW (ref 22–31)
CREAT SERPL-MCNC: 0.94 MG/DL — SIGNIFICANT CHANGE UP (ref 0.5–1.3)
CULTURE RESULTS: ABNORMAL
CULTURE RESULTS: ABNORMAL
DACRYOCYTES BLD QL SMEAR: SLIGHT — SIGNIFICANT CHANGE UP
EGFR: 60 ML/MIN/1.73M2 — SIGNIFICANT CHANGE UP
ELLIPTOCYTES BLD QL SMEAR: SIGNIFICANT CHANGE UP
EOSINOPHIL # BLD AUTO: 0 K/UL — SIGNIFICANT CHANGE UP (ref 0–0.5)
EOSINOPHIL NFR BLD AUTO: 0 % — SIGNIFICANT CHANGE UP (ref 0–6)
GIANT PLATELETS BLD QL SMEAR: PRESENT — SIGNIFICANT CHANGE UP
GLUCOSE SERPL-MCNC: 75 MG/DL — SIGNIFICANT CHANGE UP (ref 70–99)
HCT VFR BLD CALC: 33.4 % — LOW (ref 34.5–45)
HGB BLD-MCNC: 10.3 G/DL — LOW (ref 11.5–15.5)
LYMPHOCYTES # BLD AUTO: 1.58 K/UL — SIGNIFICANT CHANGE UP (ref 1–3.3)
LYMPHOCYTES # BLD AUTO: 5.2 % — LOW (ref 13–44)
MAGNESIUM SERPL-MCNC: 1.9 MG/DL — SIGNIFICANT CHANGE UP (ref 1.6–2.6)
MANUAL SMEAR VERIFICATION: SIGNIFICANT CHANGE UP
MCHC RBC-ENTMCNC: 19.8 PG — LOW (ref 27–34)
MCHC RBC-ENTMCNC: 30.8 GM/DL — LOW (ref 32–36)
MCV RBC AUTO: 64.1 FL — LOW (ref 80–100)
METAMYELOCYTES # FLD: 0.9 % — HIGH (ref 0–0)
METHOD TYPE: SIGNIFICANT CHANGE UP
MICROCYTES BLD QL: SIGNIFICANT CHANGE UP
MONOCYTES # BLD AUTO: 0.27 K/UL — SIGNIFICANT CHANGE UP (ref 0–0.9)
MONOCYTES NFR BLD AUTO: 0.9 % — LOW (ref 2–14)
NEUTROPHILS # BLD AUTO: 28.29 K/UL — HIGH (ref 1.8–7.4)
NEUTROPHILS NFR BLD AUTO: 86.1 % — HIGH (ref 43–77)
NEUTS BAND # BLD: 6.9 % — SIGNIFICANT CHANGE UP (ref 0–8)
ORGANISM # SPEC MICROSCOPIC CNT: ABNORMAL
PHOSPHATE SERPL-MCNC: 2.5 MG/DL — SIGNIFICANT CHANGE UP (ref 2.5–4.5)
PLAT MORPH BLD: NORMAL — SIGNIFICANT CHANGE UP
PLATELET # BLD AUTO: 135 K/UL — LOW (ref 150–400)
POIKILOCYTOSIS BLD QL AUTO: SIGNIFICANT CHANGE UP
POTASSIUM SERPL-MCNC: 3.6 MMOL/L — SIGNIFICANT CHANGE UP (ref 3.5–5.3)
POTASSIUM SERPL-SCNC: 3.6 MMOL/L — SIGNIFICANT CHANGE UP (ref 3.5–5.3)
PROT SERPL-MCNC: 6 G/DL — SIGNIFICANT CHANGE UP (ref 6–8.3)
RBC # BLD: 5.21 M/UL — HIGH (ref 3.8–5.2)
RBC # FLD: 16 % — HIGH (ref 10.3–14.5)
RBC BLD AUTO: ABNORMAL
SCHISTOCYTES BLD QL AUTO: SLIGHT — SIGNIFICANT CHANGE UP
SODIUM SERPL-SCNC: 141 MMOL/L — SIGNIFICANT CHANGE UP (ref 135–145)
SPECIMEN SOURCE: SIGNIFICANT CHANGE UP
SPECIMEN SOURCE: SIGNIFICANT CHANGE UP
TARGETS BLD QL SMEAR: SLIGHT — SIGNIFICANT CHANGE UP
WBC # BLD: 30.42 K/UL — HIGH (ref 3.8–10.5)
WBC # FLD AUTO: 30.42 K/UL — HIGH (ref 3.8–10.5)

## 2024-03-12 PROCEDURE — 99233 SBSQ HOSP IP/OBS HIGH 50: CPT

## 2024-03-12 PROCEDURE — 74177 CT ABD & PELVIS W/CONTRAST: CPT | Mod: 26

## 2024-03-12 RX ORDER — ONDANSETRON 8 MG/1
4 TABLET, FILM COATED ORAL EVERY 6 HOURS
Refills: 0 | Status: DISCONTINUED | OUTPATIENT
Start: 2024-03-12 | End: 2024-03-19

## 2024-03-12 RX ORDER — CEFTRIAXONE 500 MG/1
1000 INJECTION, POWDER, FOR SOLUTION INTRAMUSCULAR; INTRAVENOUS EVERY 24 HOURS
Refills: 0 | Status: DISCONTINUED | OUTPATIENT
Start: 2024-03-13 | End: 2024-03-17

## 2024-03-12 RX ORDER — CEFTRIAXONE 500 MG/1
INJECTION, POWDER, FOR SOLUTION INTRAMUSCULAR; INTRAVENOUS
Refills: 0 | Status: DISCONTINUED | OUTPATIENT
Start: 2024-03-12 | End: 2024-03-17

## 2024-03-12 RX ORDER — SENNA PLUS 8.6 MG/1
2 TABLET ORAL AT BEDTIME
Refills: 0 | Status: DISCONTINUED | OUTPATIENT
Start: 2024-03-12 | End: 2024-03-19

## 2024-03-12 RX ORDER — CEFTRIAXONE 500 MG/1
1000 INJECTION, POWDER, FOR SOLUTION INTRAMUSCULAR; INTRAVENOUS ONCE
Refills: 0 | Status: COMPLETED | OUTPATIENT
Start: 2024-03-12 | End: 2024-03-12

## 2024-03-12 RX ORDER — POLYETHYLENE GLYCOL 3350 17 G/17G
17 POWDER, FOR SOLUTION ORAL DAILY
Refills: 0 | Status: DISCONTINUED | OUTPATIENT
Start: 2024-03-12 | End: 2024-03-19

## 2024-03-12 RX ADMIN — Medication 3 MILLILITER(S): at 23:14

## 2024-03-12 RX ADMIN — Medication 1 DROP(S): at 18:06

## 2024-03-12 RX ADMIN — CHLORHEXIDINE GLUCONATE 1 APPLICATION(S): 213 SOLUTION TOPICAL at 12:14

## 2024-03-12 RX ADMIN — Medication 3 MILLILITER(S): at 18:05

## 2024-03-12 RX ADMIN — PIPERACILLIN AND TAZOBACTAM 25 GRAM(S): 4; .5 INJECTION, POWDER, LYOPHILIZED, FOR SOLUTION INTRAVENOUS at 07:34

## 2024-03-12 RX ADMIN — Medication 3 MILLILITER(S): at 12:13

## 2024-03-12 RX ADMIN — CEFTRIAXONE 1000 MILLIGRAM(S): 500 INJECTION, POWDER, FOR SOLUTION INTRAMUSCULAR; INTRAVENOUS at 09:16

## 2024-03-12 RX ADMIN — ONDANSETRON 4 MILLIGRAM(S): 8 TABLET, FILM COATED ORAL at 09:19

## 2024-03-12 RX ADMIN — ENOXAPARIN SODIUM 40 MILLIGRAM(S): 100 INJECTION SUBCUTANEOUS at 05:05

## 2024-03-12 RX ADMIN — ATORVASTATIN CALCIUM 20 MILLIGRAM(S): 80 TABLET, FILM COATED ORAL at 21:33

## 2024-03-12 RX ADMIN — Medication 1 DROP(S): at 05:06

## 2024-03-12 RX ADMIN — SENNA PLUS 2 TABLET(S): 8.6 TABLET ORAL at 21:33

## 2024-03-12 RX ADMIN — LATANOPROST 1 DROP(S): 0.05 SOLUTION/ DROPS OPHTHALMIC; TOPICAL at 21:33

## 2024-03-12 RX ADMIN — POLYETHYLENE GLYCOL 3350 17 GRAM(S): 17 POWDER, FOR SOLUTION ORAL at 18:06

## 2024-03-12 RX ADMIN — Medication 3 MILLILITER(S): at 05:06

## 2024-03-12 NOTE — PROGRESS NOTE ADULT - PROBLEM SELECTOR PLAN 2
BC + E. Coli   - source   -Continue abx as per ID  -F/u CT A/P BC + E. Coli   - source   -Continue abx as per ID

## 2024-03-13 ENCOUNTER — TRANSCRIPTION ENCOUNTER (OUTPATIENT)
Age: 84
End: 2024-03-13

## 2024-03-13 LAB
-  AMOXICILLIN/CLAVULANIC ACID: SIGNIFICANT CHANGE UP
-  AMPICILLIN/SULBACTAM: SIGNIFICANT CHANGE UP
-  AMPICILLIN: SIGNIFICANT CHANGE UP
-  AZTREONAM: SIGNIFICANT CHANGE UP
-  CEFAZOLIN: SIGNIFICANT CHANGE UP
-  CEFEPIME: SIGNIFICANT CHANGE UP
-  CEFOXITIN: SIGNIFICANT CHANGE UP
-  CEFTRIAXONE: SIGNIFICANT CHANGE UP
-  CEFUROXIME: SIGNIFICANT CHANGE UP
-  CIPROFLOXACIN: SIGNIFICANT CHANGE UP
-  ERTAPENEM: SIGNIFICANT CHANGE UP
-  GENTAMICIN: SIGNIFICANT CHANGE UP
-  IMIPENEM: SIGNIFICANT CHANGE UP
-  LEVOFLOXACIN: SIGNIFICANT CHANGE UP
-  MEROPENEM: SIGNIFICANT CHANGE UP
-  NITROFURANTOIN: SIGNIFICANT CHANGE UP
-  PIPERACILLIN/TAZOBACTAM: SIGNIFICANT CHANGE UP
-  TOBRAMYCIN: SIGNIFICANT CHANGE UP
-  TRIMETHOPRIM/SULFAMETHOXAZOLE: SIGNIFICANT CHANGE UP
ACANTHOCYTES BLD QL SMEAR: SLIGHT — SIGNIFICANT CHANGE UP
ALBUMIN SERPL ELPH-MCNC: 3 G/DL — LOW (ref 3.3–5)
ALP SERPL-CCNC: 202 U/L — HIGH (ref 40–120)
ALT FLD-CCNC: 61 U/L — HIGH (ref 10–45)
ANION GAP SERPL CALC-SCNC: 12 MMOL/L — SIGNIFICANT CHANGE UP (ref 5–17)
ANISOCYTOSIS BLD QL: SLIGHT — SIGNIFICANT CHANGE UP
AST SERPL-CCNC: 74 U/L — HIGH (ref 10–40)
BASOPHILS # BLD AUTO: 0.21 K/UL — HIGH (ref 0–0.2)
BASOPHILS NFR BLD AUTO: 0.9 % — SIGNIFICANT CHANGE UP (ref 0–2)
BILIRUB SERPL-MCNC: 0.8 MG/DL — SIGNIFICANT CHANGE UP (ref 0.2–1.2)
BUN SERPL-MCNC: 26 MG/DL — HIGH (ref 7–23)
CALCIUM SERPL-MCNC: 9 MG/DL — SIGNIFICANT CHANGE UP (ref 8.4–10.5)
CHLORIDE SERPL-SCNC: 104 MMOL/L — SIGNIFICANT CHANGE UP (ref 96–108)
CO2 SERPL-SCNC: 23 MMOL/L — SIGNIFICANT CHANGE UP (ref 22–31)
CREAT SERPL-MCNC: 0.96 MG/DL — SIGNIFICANT CHANGE UP (ref 0.5–1.3)
CULTURE RESULTS: ABNORMAL
DACRYOCYTES BLD QL SMEAR: SLIGHT — SIGNIFICANT CHANGE UP
EGFR: 59 ML/MIN/1.73M2 — LOW
ELLIPTOCYTES BLD QL SMEAR: SIGNIFICANT CHANGE UP
EOSINOPHIL # BLD AUTO: 0.21 K/UL — SIGNIFICANT CHANGE UP (ref 0–0.5)
EOSINOPHIL NFR BLD AUTO: 0.9 % — SIGNIFICANT CHANGE UP (ref 0–6)
GLUCOSE SERPL-MCNC: 85 MG/DL — SIGNIFICANT CHANGE UP (ref 70–99)
HCT VFR BLD CALC: 31 % — LOW (ref 34.5–45)
HGB BLD-MCNC: 10 G/DL — LOW (ref 11.5–15.5)
LYMPHOCYTES # BLD AUTO: 1.68 K/UL — SIGNIFICANT CHANGE UP (ref 1–3.3)
LYMPHOCYTES # BLD AUTO: 7.1 % — LOW (ref 13–44)
MANUAL SMEAR VERIFICATION: SIGNIFICANT CHANGE UP
MCHC RBC-ENTMCNC: 20.3 PG — LOW (ref 27–34)
MCHC RBC-ENTMCNC: 32.3 GM/DL — SIGNIFICANT CHANGE UP (ref 32–36)
MCV RBC AUTO: 63 FL — LOW (ref 80–100)
METHOD TYPE: SIGNIFICANT CHANGE UP
MICROCYTES BLD QL: SLIGHT — SIGNIFICANT CHANGE UP
MONOCYTES # BLD AUTO: 0.21 K/UL — SIGNIFICANT CHANGE UP (ref 0–0.9)
MONOCYTES NFR BLD AUTO: 0.9 % — LOW (ref 2–14)
NEUTROPHILS # BLD AUTO: 21.31 K/UL — HIGH (ref 1.8–7.4)
NEUTROPHILS NFR BLD AUTO: 88.5 % — HIGH (ref 43–77)
NEUTS BAND # BLD: 1.7 % — SIGNIFICANT CHANGE UP (ref 0–8)
ORGANISM # SPEC MICROSCOPIC CNT: ABNORMAL
ORGANISM # SPEC MICROSCOPIC CNT: ABNORMAL
OVALOCYTES BLD QL SMEAR: SLIGHT — SIGNIFICANT CHANGE UP
PAPPENHEIMER BOD BLD QL SMEAR: PRESENT — SIGNIFICANT CHANGE UP
PLAT MORPH BLD: NORMAL — SIGNIFICANT CHANGE UP
PLATELET # BLD AUTO: 124 K/UL — LOW (ref 150–400)
POIKILOCYTOSIS BLD QL AUTO: SIGNIFICANT CHANGE UP
POTASSIUM SERPL-MCNC: 3.9 MMOL/L — SIGNIFICANT CHANGE UP (ref 3.5–5.3)
POTASSIUM SERPL-SCNC: 3.9 MMOL/L — SIGNIFICANT CHANGE UP (ref 3.5–5.3)
PROT SERPL-MCNC: 5.8 G/DL — LOW (ref 6–8.3)
RBC # BLD: 4.92 M/UL — SIGNIFICANT CHANGE UP (ref 3.8–5.2)
RBC # FLD: 15.9 % — HIGH (ref 10.3–14.5)
RBC BLD AUTO: ABNORMAL
SCHISTOCYTES BLD QL AUTO: SLIGHT — SIGNIFICANT CHANGE UP
SODIUM SERPL-SCNC: 139 MMOL/L — SIGNIFICANT CHANGE UP (ref 135–145)
SPECIMEN SOURCE: SIGNIFICANT CHANGE UP
TARGETS BLD QL SMEAR: SLIGHT — SIGNIFICANT CHANGE UP
TOXIC GRANULES BLD QL SMEAR: PRESENT — SIGNIFICANT CHANGE UP
WBC # BLD: 23.63 K/UL — HIGH (ref 3.8–10.5)
WBC # FLD AUTO: 23.63 K/UL — HIGH (ref 3.8–10.5)

## 2024-03-13 PROCEDURE — 72197 MRI PELVIS W/O & W/DYE: CPT | Mod: 26

## 2024-03-13 PROCEDURE — 99222 1ST HOSP IP/OBS MODERATE 55: CPT

## 2024-03-13 PROCEDURE — 99232 SBSQ HOSP IP/OBS MODERATE 35: CPT | Mod: GC

## 2024-03-13 RX ORDER — TAMSULOSIN HYDROCHLORIDE 0.4 MG/1
0.4 CAPSULE ORAL AT BEDTIME
Refills: 0 | Status: DISCONTINUED | OUTPATIENT
Start: 2024-03-13 | End: 2024-03-19

## 2024-03-13 RX ADMIN — Medication 3 MILLILITER(S): at 17:23

## 2024-03-13 RX ADMIN — Medication 3 MILLILITER(S): at 13:26

## 2024-03-13 RX ADMIN — SENNA PLUS 2 TABLET(S): 8.6 TABLET ORAL at 21:38

## 2024-03-13 RX ADMIN — LATANOPROST 1 DROP(S): 0.05 SOLUTION/ DROPS OPHTHALMIC; TOPICAL at 21:39

## 2024-03-13 RX ADMIN — ENOXAPARIN SODIUM 40 MILLIGRAM(S): 100 INJECTION SUBCUTANEOUS at 05:32

## 2024-03-13 RX ADMIN — ATORVASTATIN CALCIUM 20 MILLIGRAM(S): 80 TABLET, FILM COATED ORAL at 21:39

## 2024-03-13 RX ADMIN — POLYETHYLENE GLYCOL 3350 17 GRAM(S): 17 POWDER, FOR SOLUTION ORAL at 09:46

## 2024-03-13 RX ADMIN — CEFTRIAXONE 100 MILLIGRAM(S): 500 INJECTION, POWDER, FOR SOLUTION INTRAMUSCULAR; INTRAVENOUS at 08:23

## 2024-03-13 RX ADMIN — Medication 1 DROP(S): at 06:21

## 2024-03-13 RX ADMIN — Medication 1 DROP(S): at 17:23

## 2024-03-13 RX ADMIN — CHLORHEXIDINE GLUCONATE 1 APPLICATION(S): 213 SOLUTION TOPICAL at 13:26

## 2024-03-13 RX ADMIN — Medication 3 MILLILITER(S): at 05:32

## 2024-03-13 RX ADMIN — TAMSULOSIN HYDROCHLORIDE 0.4 MILLIGRAM(S): 0.4 CAPSULE ORAL at 21:39

## 2024-03-13 NOTE — PHYSICAL THERAPY INITIAL EVALUATION ADULT - NSPTDISCHREC_GEN_A_CORE
PT recommend Subacute rehab , if home pt need assist all fxl activity and adl's dtr and pt understood ; pt owns a rolling walker , std cane, shower chair and grab bars in shower ; pt if home would need transport home as pt has steps to negotiate and unable to do at this time , pt currently on 4 L nc o2 not on Home O2/Sub-acute Rehab

## 2024-03-13 NOTE — PHYSICAL THERAPY INITIAL EVALUATION ADULT - IMPAIRMENTS CONTRIBUTING IMPAIRED BED MOBILITY, REHAB EVAL
decrease endurance , sat EOB x  several min pt intially need min of 1 to sit upright intermittent retropulsion , then scoot forward with feet support on floor pt need cg of 1/impaired balance/impaired postural control/decreased strength

## 2024-03-13 NOTE — PHYSICAL THERAPY INITIAL EVALUATION ADULT - GAIT DEVIATIONS NOTED, PT EVAL
decreased ally/increased time in double stance/decreased step length/decreased stride length/decreased weight-shifting ability

## 2024-03-13 NOTE — CONSULT NOTE ADULT - SUBJECTIVE AND OBJECTIVE BOX
"Pt is an 82yo F w/ PMH of HLD, macular degeneration, thalassemia minor and melanoma pw AMS and hypoxia.     Hx obtained from daughter at bedside along w/ patient.     Reports feeling well yesterday w/out acute symptoms. Today daughter was unable to reach pt so had neighbor perform wellness check at which point pt was found altered in her bed for which EMS was called.     Patient denies any acute symptoms such as F/C, CP, SOB, cough, nasal congestion/rhinorrhea, abd pain, N/V, constipation or diarrhea. Does wear depends when sleeping d/t incontinence but denies any dysuria. She was otherwise unable to recall the events that transpired this afternoon.     In the ED febrile, tachy w/ hypoxia (80% on RA per EMS) w/ lactic acidosis w/ positive UA and CXR w/ c/f PNA vs atelectasis s/p Vanc, Zosyn, 2L IVF and HFNC for hypoxia."    Urology consulted for left hydronephrosis      PAST MEDICAL & SURGICAL HISTORY:  HLD (hyperlipidemia)      Macular degeneration      H/O thalassemia minor      S/P cataract surgery      S/P hip replacement        FAMILY HISTORY:  FH: glaucoma (Father)      MEDICATIONS  (STANDING):  albuterol/ipratropium for Nebulization 3 milliLiter(s) Nebulizer every 6 hours  atorvastatin 20 milliGRAM(s) Oral at bedtime  cefTRIAXone   IVPB 1000 milliGRAM(s) IV Intermittent every 24 hours  cefTRIAXone   IVPB      chlorhexidine 2% Cloths 1 Application(s) Topical daily  enoxaparin Injectable 40 milliGRAM(s) SubCutaneous every 24 hours  latanoprost 0.005% Ophthalmic Solution 1 Drop(s) Both EYES at bedtime  senna 2 Tablet(s) Oral at bedtime  timolol 0.5% Solution 1 Drop(s) Both EYES two times a day    MEDICATIONS  (PRN):  acetaminophen     Tablet .. 650 milliGRAM(s) Oral every 6 hours PRN Temp greater or equal to 38C (100.4F), Mild Pain (1 - 3)  ondansetron Injectable 4 milliGRAM(s) IV Push every 6 hours PRN Nausea and/or Vomiting  polyethylene glycol 3350 17 Gram(s) Oral daily PRN Constipation    Allergies    No Known Allergies    Intolerances        REVIEW OF SYSTEMS: Pertinent positives and negatives as stated in HPI, otherwise negative    Vital signs  T(C): 37 (03-12-24 @ 23:38), Max: 37 (03-12-24 @ 23:38)  HR: 80 (03-13-24 @ 09:07)  BP: 143/75 (03-13-24 @ 09:07)  SpO2: 94% (03-13-24 @ 09:07)  Wt(kg): --    Physical Exam  Gen: NAD  HEENT: normocephalic, atraumatic, no scleral icterus  Pulm:  No respiratory distress, no subcostal retractions, no accessory muscle use   CV:  no JVD  Abd: Soft, NT, ND, no rebound tenderness or guarding  :   MSK:  No CVAT, Moving all extremities, full ROM in all extremities, No edema present  NEURO: A&Ox3, no focal neurological deficits, CN 2-12 grossly intact  SKIN: warm, dry, no rash.    LABS:        03-13 @ 07:11    WBC 23.63 / Hct 31.0  / SCr --       03-13 @ 07:10    WBC --    / Hct --    / SCr 0.96     03-13    139  |  104  |  26<H>  ----------------------------<  85  3.9   |  23  |  0.96    Ca    9.0      13 Mar 2024 07:10  Phos  2.5     03-12  Mg     1.9     03-12    TPro  5.8<L>  /  Alb  3.0<L>  /  TBili  0.8  /  DBili  x   /  AST  74<H>  /  ALT  61<H>  /  AlkPhos  202<H>  03-13      Urinalysis Basic - ( 13 Mar 2024 07:10 )    Color: x / Appearance: x / SG: x / pH: x  Gluc: 85 mg/dL / Ketone: x  / Bili: x / Urobili: x   Blood: x / Protein: x / Nitrite: x   Leuk Esterase: x / RBC: x / WBC x   Sq Epi: x / Non Sq Epi: x / Bacteria: x        Urine Cx: 3/10 +e.coli  Blood Cx: 3/10 +e.coli    Radiology:  ACC: [id number] EXAM:  CT ABDOMEN AND PELVIS IC   ORDERED BY: CAYLA PEREA     PROCEDURE DATE:  03/12/2024        INTERPRETATION:  CLINICAL INFORMATION: Escherichia coli sepsis.    COMPARISON: None.    CONTRAST/COMPLICATIONS:  IV Contrast: Omnipaque 350  90 cc administered   10 cc discarded  Oral Contrast: NONE  Complications: None reported at time of study completion    PROCEDURE:  CT of the Abdomen and Pelvis was performed.  Sagittal and coronal reformats were performed.    FINDINGS:  LOWER CHEST: Small bilateral pleural effusions, left greater than right   with associated basilar atelectasis. Cardiomegaly.    LIVER: Within normal limits.  BILE DUCTS: Normal caliber.  GALLBLADDER: Within normal limits.  SPLEEN: Within normal limits.  PANCREAS: Within normal limits.  ADRENALS: Within normal limits.  KIDNEYS/URETERS: 2 mm nonobstructing right renal calculus. Mild left   hydronephrosis with urothelial thickening especially of the renal pelvis   and bilateral perinephric edema. No ureteral dilatation.    BLADDER: Within normal limits.  REPRODUCTIVE ORGANS: The endometrium is prominent, possibly thickened at   the fundus. Correlate with pelvic ultrasound.    BOWEL: Small focus of fluid in the gastric fundus (3, 49), question   superficial erosion. No surrounding inflammatory changes. No bowel   obstruction. Appendix is normal.  PERITONEUM: No ascites.  VESSELS: Within normal limits.  RETROPERITONEUM/LYMPH NODES: No lymphadenopathy.  ABDOMINAL WALL: Within normal limits.  BONES: Degenerative changes. Right hip arthroplasty resulting in streak   artifact in the pelvis.    IMPRESSION:  Mild left hydronephrosis with urothelial thickening and bilateral   perinephric edema. Correlate with urinalysis for urinary tract infection.    Small fluid versus possible superficial erosion in the gastric fundus. No   surrounding inflammatory changes. Endoscopy would be more sensitive to   assess for peptic ulcer disease.    Endometrial thickening is questioned. Correlate with pelvic ultrasound.    --- End of Report ---      SORAIDA GIL MD; Attending Radiologist  This document has been electronically signed. Mar 12 2024  2:36PM "Pt is an 84yo F w/ PMH of HLD, macular degeneration, thalassemia minor and melanoma pw AMS and hypoxia.     Hx obtained from daughter at bedside along w/ patient.     Reports feeling well yesterday w/out acute symptoms. Today daughter was unable to reach pt so had neighbor perform wellness check at which point pt was found altered in her bed for which EMS was called.     Patient denies any acute symptoms such as F/C, CP, SOB, cough, nasal congestion/rhinorrhea, abd pain, N/V, constipation or diarrhea. Does wear depends when sleeping d/t incontinence but denies any dysuria. She was otherwise unable to recall the events that transpired this afternoon.     In the ED febrile, tachy w/ hypoxia (80% on RA per EMS) w/ lactic acidosis w/ positive UA and CXR w/ c/f PNA vs atelectasis s/p Vanc, Zosyn, 2L IVF and HFNC for hypoxia."    Urology consulted for left hydronephrosis.    Pt denies any hx of recurrent UTI. Does endorse intermittent gross hematuria as well some stress incontinence - states she uses 2 pads at day and 1 at night.        PAST MEDICAL & SURGICAL HISTORY:  HLD (hyperlipidemia)      Macular degeneration      H/O thalassemia minor      S/P cataract surgery      S/P hip replacement        FAMILY HISTORY:  FH: glaucoma (Father)      MEDICATIONS  (STANDING):  albuterol/ipratropium for Nebulization 3 milliLiter(s) Nebulizer every 6 hours  atorvastatin 20 milliGRAM(s) Oral at bedtime  cefTRIAXone   IVPB 1000 milliGRAM(s) IV Intermittent every 24 hours  cefTRIAXone   IVPB      chlorhexidine 2% Cloths 1 Application(s) Topical daily  enoxaparin Injectable 40 milliGRAM(s) SubCutaneous every 24 hours  latanoprost 0.005% Ophthalmic Solution 1 Drop(s) Both EYES at bedtime  senna 2 Tablet(s) Oral at bedtime  timolol 0.5% Solution 1 Drop(s) Both EYES two times a day    MEDICATIONS  (PRN):  acetaminophen     Tablet .. 650 milliGRAM(s) Oral every 6 hours PRN Temp greater or equal to 38C (100.4F), Mild Pain (1 - 3)  ondansetron Injectable 4 milliGRAM(s) IV Push every 6 hours PRN Nausea and/or Vomiting  polyethylene glycol 3350 17 Gram(s) Oral daily PRN Constipation    Allergies    No Known Allergies    Intolerances        REVIEW OF SYSTEMS: Pertinent positives and negatives as stated in HPI, otherwise negative    Vital signs  T(C): 37 (03-12-24 @ 23:38), Max: 37 (03-12-24 @ 23:38)  HR: 80 (03-13-24 @ 09:07)  BP: 143/75 (03-13-24 @ 09:07)  SpO2: 94% (03-13-24 @ 09:07)  Wt(kg): --    Physical Exam  Gen: NAD  HEENT: normocephalic, atraumatic, no scleral icterus  Pulm:  No respiratory distress, no subcostal retractions, no accessory muscle use   CV:  no JVD  Abd: Soft, NT, ND, no rebound tenderness or guarding  MSK:  No CVAT, Moving all extremities, full ROM in all extremities, No edema present  NEURO: A&Ox3, no focal neurological deficits, CN 2-12 grossly intact  SKIN: warm, dry, no rash.    LABS:        03-13 @ 07:11    WBC 23.63 / Hct 31.0  / SCr --       03-13 @ 07:10    WBC --    / Hct --    / SCr 0.96     03-13    139  |  104  |  26<H>  ----------------------------<  85  3.9   |  23  |  0.96    Ca    9.0      13 Mar 2024 07:10  Phos  2.5     03-12  Mg     1.9     03-12    TPro  5.8<L>  /  Alb  3.0<L>  /  TBili  0.8  /  DBili  x   /  AST  74<H>  /  ALT  61<H>  /  AlkPhos  202<H>  03-13      Urinalysis Basic - ( 13 Mar 2024 07:10 )    Color: x / Appearance: x / SG: x / pH: x  Gluc: 85 mg/dL / Ketone: x  / Bili: x / Urobili: x   Blood: x / Protein: x / Nitrite: x   Leuk Esterase: x / RBC: x / WBC x   Sq Epi: x / Non Sq Epi: x / Bacteria: x        Urine Cx: 3/10 +e.coli  Blood Cx: 3/10 +e.coli    Radiology:  ACC: [id number] EXAM:  CT ABDOMEN AND PELVIS IC   ORDERED BY: CAYLA PEREA     PROCEDURE DATE:  03/12/2024        INTERPRETATION:  CLINICAL INFORMATION: Escherichia coli sepsis.    COMPARISON: None.    CONTRAST/COMPLICATIONS:  IV Contrast: Omnipaque 350  90 cc administered   10 cc discarded  Oral Contrast: NONE  Complications: None reported at time of study completion    PROCEDURE:  CT of the Abdomen and Pelvis was performed.  Sagittal and coronal reformats were performed.    FINDINGS:  LOWER CHEST: Small bilateral pleural effusions, left greater than right   with associated basilar atelectasis. Cardiomegaly.    LIVER: Within normal limits.  BILE DUCTS: Normal caliber.  GALLBLADDER: Within normal limits.  SPLEEN: Within normal limits.  PANCREAS: Within normal limits.  ADRENALS: Within normal limits.  KIDNEYS/URETERS: 2 mm nonobstructing right renal calculus. Mild left   hydronephrosis with urothelial thickening especially of the renal pelvis   and bilateral perinephric edema. No ureteral dilatation.    BLADDER: Within normal limits.  REPRODUCTIVE ORGANS: The endometrium is prominent, possibly thickened at   the fundus. Correlate with pelvic ultrasound.    BOWEL: Small focus of fluid in the gastric fundus (3, 49), question   superficial erosion. No surrounding inflammatory changes. No bowel   obstruction. Appendix is normal.  PERITONEUM: No ascites.  VESSELS: Within normal limits.  RETROPERITONEUM/LYMPH NODES: No lymphadenopathy.  ABDOMINAL WALL: Within normal limits.  BONES: Degenerative changes. Right hip arthroplasty resulting in streak   artifact in the pelvis.    IMPRESSION:  Mild left hydronephrosis with urothelial thickening and bilateral   perinephric edema. Correlate with urinalysis for urinary tract infection.    Small fluid versus possible superficial erosion in the gastric fundus. No   surrounding inflammatory changes. Endoscopy would be more sensitive to   assess for peptic ulcer disease.    Endometrial thickening is questioned. Correlate with pelvic ultrasound.    --- End of Report ---      SORAIDA GIL MD; Attending Radiologist  This document has been electronically signed. Mar 12 2024  2:36PM

## 2024-03-13 NOTE — PHYSICAL THERAPY INITIAL EVALUATION ADULT - IMPAIRED TRANSFERS: SIT/STAND, REHAB EVAL
decrease endurance , sit-stand x1 at wt scale with pca and PT , pt need min of 1 sit-stand and wt recorded by PCA , pt then sat and rested on bed few min cg of 1 , repeat sit-stand at rolling walker min of 1 min unsteady work on wt shift and balance pt on 4 L nc o2/impaired balance/impaired postural control/decreased strength

## 2024-03-13 NOTE — PHYSICAL THERAPY INITIAL EVALUATION ADULT - PREDICTED DURATION OF THERAPY (DAYS/WKS), PT EVAL
Message   Recorded as Task   Date: 12/15/2016 01:17 PM, Created By: Gwen Minaya   Task Name: Care Coordination   Assigned To: [facility] ken triage,Team   Regarding Patient: German Burt, Status: Active   Comment:    Gwen Minaya - 15 Dec 2016 1:17 PM     TASK CREATED  Caller: Melissa Villagomez, Mother; Care Coordination; [phone]  NEEDS COPY OF CAT SCAN DONE LAST WEEK GOING MRI TOMORROW THEY WANT A COPY   ChuckPao - 15 Dec 2016 1:46 PM     TASK EDITED  Mom needs actual disc of CAT scan  Mom informed needs to pu up at Medical records dept  Mom will go do today  Active Problems    1  Amblyopia (368 00) (H53 009)   2  Developmental delay (783 40) (R62 50)   3  Hydrocephalus (331 4) (G91 9)   4  Obesity (278 00) (E66 9)   5  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
3 weeks

## 2024-03-13 NOTE — PHYSICAL THERAPY INITIAL EVALUATION ADULT - IMPAIRMENTS FOUND, PT EVAL
+ nc O2 4 L; h/o macular degeneration glasses on in session/aerobic capacity/endurance/cognitive impairment/gait, locomotion, and balance/muscle strength/ventilation and respiration/gas exchange/visual motor

## 2024-03-13 NOTE — PHYSICAL THERAPY INITIAL EVALUATION ADULT - IMPAIRMENTS CONTRIBUTING TO GAIT DEVIATIONS, PT EVAL
decrease endurance , min unsteady vc to stand upright ,pulse ox 96% on 4 L nc o2/impaired balance/impaired postural control/decreased strength

## 2024-03-13 NOTE — PHYSICAL THERAPY INITIAL EVALUATION ADULT - ADDITIONAL COMMENTS
lives alone in apt + elevator , indep adl's uses std cane PTA independent for amb ; dtr Petra ID as caregiver and emergency contact [phone] lives alone in apt + elevator , indep adl's uses std cane PTA independent for amb not always in apt but when leave apt uses std cane ; pt has 2 flights of steps to gym level (pt goes with dtr a couple times a week ) with HR  in apt building ; pt also has 4-5 steps to elevator with HR from garage entrance ; dtr Petra HANNAH as caregiver and emergency contact [phone] and present for session ; pt has a tub shower with grab bars (also has a shower seat but has not needed to use , PT recommend use ; pt not on Home O2 on O2 in hospital now 4 L:

## 2024-03-13 NOTE — PHYSICAL THERAPY INITIAL EVALUATION ADULT - GENERAL OBSERVATIONS, REHAB EVAL
pt received in bed all siderails up HOB 35 degrees call bell,phone and table in reach wheels locked + nc O2 4 L purewick to wall suction ; pt agreebale for PT feels weak . dtr Petra at b/s

## 2024-03-13 NOTE — PHYSICAL THERAPY INITIAL EVALUATION ADULT - PLANNED THERAPY INTERVENTIONS, PT EVAL
GOAL stair train : pt will negotiate a flight of steps with HR with cg of 1 to close supervision in 2-3 weeks/balance training/bed mobility training/gait training/strengthening/transfer training

## 2024-03-13 NOTE — PHYSICAL THERAPY INITIAL EVALUATION ADULT - PERTINENT HX OF CURRENT PROBLEM, REHAB EVAL
CT ABD 3/12/24 :Mild left hydronephrosis with urothelial thickening and bilateral perinephric edema. Correlate with urinalysis for urinary tract infection.  Small fluid versus possible superficial erosion in the gastric fundus. No surrounding inflammatory changes. Endoscopy would be more sensitive to   assess for peptic ulcer disease.Endometrial thickening is questioned. Correlate with pelvic ultrasound.  CXR 3/12/24 Lower lobe opactiies , L trace pleural effusion   HCT 3/10/24 : Trace hyperattenuation along the left middle frontal cortex, likely reflecting gyriform mineralization. No other suspicion of acute   intracranial bleeding.Bilateral occipital encephalomalacia.Central volume loss and advanced microvascular ischemic changes. 82yo F w/ PMH of HLD, macular degeneration, thalassemia minor and melanoma pw AMS and hypoxia. Hx obtained from daughter at bedside along w/ patient. Reports feeling well yesterday w/out acute symptoms. Today daughter was unable to reach pt so had neighbor perform wellness check at which point pt was found altered in her bed for which EMS was called. Patient denies any acute symptoms such as F/C, CP, SOB, cough, nasal congestion/rhinorrhea, abd pain, N/V, constipation or diarrhea. Does wear depends when sleeping d/t incontinence but denies any dysuria. She was otherwise unable to recall the events that transpired this afternoon. In the ED febrile, tachy w/ hypoxia (80% on RA per EMS) w/ lactic acidosis w/ positive UA and CXR w/ c/f PNA vs atelectasis s/p Vanc, Zosyn, 2L IVF and HFNC for hypoxia.     CT ABD 3/12/24 :Mild left hydronephrosis with urothelial thickening and bilateral perinephric edema. Correlate with urinalysis for urinary tract infection.  Small fluid versus possible superficial erosion in the gastric fundus. No surrounding inflammatory changes. Endoscopy would be more sensitive to   assess for peptic ulcer disease. Endometrial thickening is questioned. Correlate with pelvic ultrasound.  CXR 3/12/24 Lower lobe opactiies , L trace pleural effusion   HCT 3/10/24 : Trace hyperattenuation along the left middle frontal cortex, likely reflecting gyriform mineralization. No other suspicion of acute   intracranial bleeding.Bilateral occipital encephalomalacia.Central volume loss and advanced microvascular ischemic changes.

## 2024-03-13 NOTE — CONSULT NOTE ADULT - ASSESSMENT
84 y/o female admitted with AMS found to be septic secondary to ecoli bacteremia.  Urology consulted for left sided hydronephrosis    -  -  -  -  -  will discuss with attending, full recs to follow  82 y/o female admitted with AMS found to be septic secondary to ecoli bacteremia.  Urology consulted for left sided hydronephrosis - could be secondary to a pyelonephritis    -cont abx  -check post void residual bladder scan  -will discuss with attending, full recs to follow  84 y/o female admitted with AMS found to be septic secondary to ecoli bacteremia.  Urology consulted for left sided hydronephrosis, noted a L 2mm UVJ stone     Plan:  - Monitor patients hemodynamics, fevers and WBC   - Fu ID recommendations, continue abx   - Please page urology immediately if patient has hemodynamic instability or fevers  - Please repeat CT pelvis noncon in 2 days to see if patient has passed her stone  - Flomax 0.4mg daily   - Check PVR to ensure patient is emptying well   - Follow up repeat blood cultures    Discussed with Dr. Helena Abdul [place] [facility]  [address] [place] [address]  [phone]   84 y/o female admitted with AMS found to be septic secondary to ecoli bacteremia.  Urology consulted for left sided hydronephrosis, noted a L 2mm UVJ stone     Plan:  - Monitor patients hemodynamics, fevers and WBC   - Fu ID recommendations, continue abx   - Please page urology immediately if patient has hemodynamic instability or fevers  - Please repeat CT pelvis noncon in 2 days to see if patient has passed her stone  - Flomax 0.4mg daily   - Check PVR to ensure patient is emptying well   - Strain urine for calculi   - Follow up repeat blood cultures    Discussed with Dr. Helena Abdul [place] for [facility]  [address] [place], [place] [id number] [phone]

## 2024-03-13 NOTE — CONSULT NOTE ADULT - ATTENDING COMMENTS
CT images reviewed showing mild-mod left hydro, no delayed nephrogram, but small distal left ureteral stone at UVJ.  As patient is clinically improving, she may not need a stent, but will consider it if the patient develops recurrence high grade temps or any HD instability  Repeat non-contrast CT pelvis to reassess for stone passage.

## 2024-03-14 LAB
ALBUMIN SERPL ELPH-MCNC: 3.1 G/DL — LOW (ref 3.3–5)
ALP SERPL-CCNC: 231 U/L — HIGH (ref 40–120)
ALT FLD-CCNC: 106 U/L — HIGH (ref 10–45)
ANION GAP SERPL CALC-SCNC: 9 MMOL/L — SIGNIFICANT CHANGE UP (ref 5–17)
AST SERPL-CCNC: 69 U/L — HIGH (ref 10–40)
BILIRUB SERPL-MCNC: 0.4 MG/DL — SIGNIFICANT CHANGE UP (ref 0.2–1.2)
BUN SERPL-MCNC: 21 MG/DL — SIGNIFICANT CHANGE UP (ref 7–23)
CALCIUM SERPL-MCNC: 9 MG/DL — SIGNIFICANT CHANGE UP (ref 8.4–10.5)
CHLORIDE SERPL-SCNC: 105 MMOL/L — SIGNIFICANT CHANGE UP (ref 96–108)
CO2 SERPL-SCNC: 25 MMOL/L — SIGNIFICANT CHANGE UP (ref 22–31)
CREAT SERPL-MCNC: 0.86 MG/DL — SIGNIFICANT CHANGE UP (ref 0.5–1.3)
EGFR: 67 ML/MIN/1.73M2 — SIGNIFICANT CHANGE UP
GLUCOSE SERPL-MCNC: 90 MG/DL — SIGNIFICANT CHANGE UP (ref 70–99)
HCT VFR BLD CALC: 28.6 % — LOW (ref 34.5–45)
HGB BLD-MCNC: 9 G/DL — LOW (ref 11.5–15.5)
MCHC RBC-ENTMCNC: 19.8 PG — LOW (ref 27–34)
MCHC RBC-ENTMCNC: 31.5 GM/DL — LOW (ref 32–36)
MCV RBC AUTO: 63 FL — LOW (ref 80–100)
NRBC # BLD: 0 /100 WBCS — SIGNIFICANT CHANGE UP (ref 0–0)
PLATELET # BLD AUTO: 150 K/UL — SIGNIFICANT CHANGE UP (ref 150–400)
POTASSIUM SERPL-MCNC: 3.8 MMOL/L — SIGNIFICANT CHANGE UP (ref 3.5–5.3)
POTASSIUM SERPL-SCNC: 3.8 MMOL/L — SIGNIFICANT CHANGE UP (ref 3.5–5.3)
PROT SERPL-MCNC: 5.6 G/DL — LOW (ref 6–8.3)
RBC # BLD: 4.54 M/UL — SIGNIFICANT CHANGE UP (ref 3.8–5.2)
RBC # FLD: 15.9 % — HIGH (ref 10.3–14.5)
SODIUM SERPL-SCNC: 139 MMOL/L — SIGNIFICANT CHANGE UP (ref 135–145)
WBC # BLD: 14.19 K/UL — HIGH (ref 3.8–10.5)
WBC # FLD AUTO: 14.19 K/UL — HIGH (ref 3.8–10.5)

## 2024-03-14 PROCEDURE — 99232 SBSQ HOSP IP/OBS MODERATE 35: CPT

## 2024-03-14 PROCEDURE — 76705 ECHO EXAM OF ABDOMEN: CPT | Mod: 26

## 2024-03-14 PROCEDURE — 99232 SBSQ HOSP IP/OBS MODERATE 35: CPT | Mod: GC

## 2024-03-14 RX ADMIN — Medication 3 MILLILITER(S): at 05:15

## 2024-03-14 RX ADMIN — CEFTRIAXONE 100 MILLIGRAM(S): 500 INJECTION, POWDER, FOR SOLUTION INTRAMUSCULAR; INTRAVENOUS at 10:04

## 2024-03-14 RX ADMIN — LATANOPROST 1 DROP(S): 0.05 SOLUTION/ DROPS OPHTHALMIC; TOPICAL at 23:23

## 2024-03-14 RX ADMIN — Medication 1 DROP(S): at 05:14

## 2024-03-14 RX ADMIN — Medication 1 DROP(S): at 18:32

## 2024-03-14 RX ADMIN — ATORVASTATIN CALCIUM 20 MILLIGRAM(S): 80 TABLET, FILM COATED ORAL at 23:22

## 2024-03-14 RX ADMIN — TAMSULOSIN HYDROCHLORIDE 0.4 MILLIGRAM(S): 0.4 CAPSULE ORAL at 23:22

## 2024-03-14 RX ADMIN — Medication 3 MILLILITER(S): at 00:07

## 2024-03-14 RX ADMIN — Medication 3 MILLILITER(S): at 23:23

## 2024-03-14 RX ADMIN — ENOXAPARIN SODIUM 40 MILLIGRAM(S): 100 INJECTION SUBCUTANEOUS at 05:15

## 2024-03-14 RX ADMIN — Medication 3 MILLILITER(S): at 18:32

## 2024-03-14 NOTE — PROGRESS NOTE ADULT - PROBLEM SELECTOR PLAN 2
with sepsis and bacteremia  ID help appreciated  continue ceftriaxone IV  discussed with ID   will continue in spite of rising LFTs  continue to monitor  no intervention at this time per urology  repeat CT noncontrast 15th to assess if stone has passed

## 2024-03-15 LAB
ALBUMIN SERPL ELPH-MCNC: 3 G/DL — LOW (ref 3.3–5)
ALP SERPL-CCNC: 173 U/L — HIGH (ref 40–120)
ALT FLD-CCNC: 90 U/L — HIGH (ref 10–45)
ANION GAP SERPL CALC-SCNC: 11 MMOL/L — SIGNIFICANT CHANGE UP (ref 5–17)
AST SERPL-CCNC: 48 U/L — HIGH (ref 10–40)
BILIRUB SERPL-MCNC: 0.4 MG/DL — SIGNIFICANT CHANGE UP (ref 0.2–1.2)
BUN SERPL-MCNC: 17 MG/DL — SIGNIFICANT CHANGE UP (ref 7–23)
CALCIUM SERPL-MCNC: 8.8 MG/DL — SIGNIFICANT CHANGE UP (ref 8.4–10.5)
CHLORIDE SERPL-SCNC: 101 MMOL/L — SIGNIFICANT CHANGE UP (ref 96–108)
CO2 SERPL-SCNC: 25 MMOL/L — SIGNIFICANT CHANGE UP (ref 22–31)
CREAT SERPL-MCNC: 0.79 MG/DL — SIGNIFICANT CHANGE UP (ref 0.5–1.3)
EGFR: 74 ML/MIN/1.73M2 — SIGNIFICANT CHANGE UP
GLUCOSE SERPL-MCNC: 114 MG/DL — HIGH (ref 70–99)
HAV IGM SER-ACNC: SIGNIFICANT CHANGE UP
HBV CORE IGM SER-ACNC: SIGNIFICANT CHANGE UP
HBV SURFACE AG SER-ACNC: SIGNIFICANT CHANGE UP
HCT VFR BLD CALC: 27.9 % — LOW (ref 34.5–45)
HCV AB S/CO SERPL IA: 0.09 S/CO — SIGNIFICANT CHANGE UP (ref 0–0.99)
HCV AB SERPL-IMP: SIGNIFICANT CHANGE UP
HGB BLD-MCNC: 9 G/DL — LOW (ref 11.5–15.5)
MCHC RBC-ENTMCNC: 20 PG — LOW (ref 27–34)
MCHC RBC-ENTMCNC: 32.3 GM/DL — SIGNIFICANT CHANGE UP (ref 32–36)
MCV RBC AUTO: 61.9 FL — LOW (ref 80–100)
NRBC # BLD: 0 /100 WBCS — SIGNIFICANT CHANGE UP (ref 0–0)
PLATELET # BLD AUTO: 162 K/UL — SIGNIFICANT CHANGE UP (ref 150–400)
POTASSIUM SERPL-MCNC: 3.5 MMOL/L — SIGNIFICANT CHANGE UP (ref 3.5–5.3)
POTASSIUM SERPL-SCNC: 3.5 MMOL/L — SIGNIFICANT CHANGE UP (ref 3.5–5.3)
PROT SERPL-MCNC: 5.8 G/DL — LOW (ref 6–8.3)
RBC # BLD: 4.51 M/UL — SIGNIFICANT CHANGE UP (ref 3.8–5.2)
RBC # FLD: 15.2 % — HIGH (ref 10.3–14.5)
SODIUM SERPL-SCNC: 137 MMOL/L — SIGNIFICANT CHANGE UP (ref 135–145)
WBC # BLD: 10.9 K/UL — HIGH (ref 3.8–10.5)
WBC # FLD AUTO: 10.9 K/UL — HIGH (ref 3.8–10.5)

## 2024-03-15 PROCEDURE — 71045 X-RAY EXAM CHEST 1 VIEW: CPT | Mod: 26

## 2024-03-15 PROCEDURE — 99233 SBSQ HOSP IP/OBS HIGH 50: CPT

## 2024-03-15 PROCEDURE — 74176 CT ABD & PELVIS W/O CONTRAST: CPT | Mod: 26

## 2024-03-15 RX ADMIN — ATORVASTATIN CALCIUM 20 MILLIGRAM(S): 80 TABLET, FILM COATED ORAL at 21:21

## 2024-03-15 RX ADMIN — Medication 1 DROP(S): at 05:57

## 2024-03-15 RX ADMIN — Medication 3 MILLILITER(S): at 23:00

## 2024-03-15 RX ADMIN — LATANOPROST 1 DROP(S): 0.05 SOLUTION/ DROPS OPHTHALMIC; TOPICAL at 21:22

## 2024-03-15 RX ADMIN — Medication 3 MILLILITER(S): at 05:57

## 2024-03-15 RX ADMIN — CEFTRIAXONE 100 MILLIGRAM(S): 500 INJECTION, POWDER, FOR SOLUTION INTRAMUSCULAR; INTRAVENOUS at 07:40

## 2024-03-15 RX ADMIN — TAMSULOSIN HYDROCHLORIDE 0.4 MILLIGRAM(S): 0.4 CAPSULE ORAL at 21:22

## 2024-03-15 RX ADMIN — CHLORHEXIDINE GLUCONATE 1 APPLICATION(S): 213 SOLUTION TOPICAL at 11:33

## 2024-03-15 RX ADMIN — Medication 1 DROP(S): at 17:13

## 2024-03-15 RX ADMIN — ENOXAPARIN SODIUM 40 MILLIGRAM(S): 100 INJECTION SUBCUTANEOUS at 05:57

## 2024-03-15 RX ADMIN — Medication 3 MILLILITER(S): at 17:13

## 2024-03-15 RX ADMIN — Medication 3 MILLILITER(S): at 11:33

## 2024-03-15 NOTE — PROGRESS NOTE ADULT - NS ATTEND AMEND GEN_ALL_CORE FT
cxr with some improvement  continue abx as per ID  keep sat>90%  continue duoneb
continue abx  continue duoneb  keep sat>90% w o2 as needed

## 2024-03-15 NOTE — PROGRESS NOTE ADULT - PROBLEM SELECTOR PLAN 2
with sepsis and bacteremia  ID help appreciated  continue ceftriaxone IV  LFTs improved   US liver negative   continue to monitor  no intervention at this time per urology  repeat CT noncontrast 15th to assess if stone has passed pending

## 2024-03-15 NOTE — CHART NOTE - NSCHARTNOTEFT_GEN_A_CORE
82 y/o female admitted with AMS found to be septic secondary to ecoli bacteremia.  Urology consulted for left sided hydronephrosis, noted a L 2mm UVJ stone     3/15/23: CT repeat demonstrated interval passage of small L 2mm uvj stone. COntinue care per primary team for pyelonephrosis.     CT reviewed:    < from: CT Abdomen and Pelvis No Cont (03.15.24 @ 13:57) >      ACC: [id number] EXAM:  CT ABDOMEN AND PELVIS   ORDERED BY: VASQUEZ MCDONOUGH     PROCEDURE DATE:  03/15/2024          INTERPRETATION:  CLINICAL INFORMATION: eval kidney stone    stone hunt   protocol Admitting Dxs: A41.9 SEPSIS, UNSPECIFIED ORGANISM MCT    COMPARISON: 3.12.24.    CONTRAST/COMPLICATIONS:  IV Contrast: NONE  Oral Contrast: NONE  Complications: None reported at time of study completion    PROCEDURE:  CT of the Abdomen and Pelvis was performed.  Sagittal and coronal reformats wereperformed.    FINDINGS:    LOWER CHEST: Small pleural effusions.  Scattered reticular opacities and   bibasilar subsegmental atelectasis again noted.    LIVER: Within normal limits.  BILE DUCTS: Normal caliber.  GALLBLADDER: Within normal limits.  SPLEEN: Within normal limits.  PANCREAS: Within normal limits.  ADRENALS: Within normal limits.  KIDNEYS/URETERS: A 2 mm nonobstructing right renal calculus. Left   parapelvic cysts. Mild dilatation left renal pelvis is unchanged.    BLADDER: Within normal limits.  REPRODUCTIVE ORGANS: Within normal limits.    BOWEL: No bowel obstruction. The appendix is normal.  PERITONEUM: No ascites.  VESSELS:  Within normal limits.  RETROPERITONEUM/LYMPH NODES: No lymphadenopathy.  ABDOMINAL WALL: Within normal limits.  BONES: Right hip arthroplasty.    IMPRESSION: A 2 mm nonobstructing right renal calculus. Mild dilatation   left renal pelvis is unchanged.        --- End of Report ---            ELSY JOHANSEN MD; Attending Radiologist  This document has been electronically signed. Mar 15 2024  2:59PM    < end of copied text >        Plan:  - Trend WBC, improving.  - Monitor patients hemodynamics, fevers and WBC   - Fu ID recommendations, continue abx   - No further urological intervention  - Patient can follow up with [facility] as an outpatient for further stone management and prevention    Discussed with Dr. Malik   [facility]  [address] [place] [address]  [phone] 82 y/o female admitted with AMS found to be septic secondary to ecoli bacteremia.  Urology consulted for left sided hydronephrosis, noted a L 2mm UVJ stone     3/15/23: CT repeat demonstrated interval passage of small L 2mm uvj stone. COntinue care per primary team for pyelonephrosis.     CT reviewed:    < from: CT Abdomen and Pelvis No Cont (03.15.24 @ 13:57) >      ACC: [id number] EXAM:  CT ABDOMEN AND PELVIS   ORDERED BY: VASQUEZ MCDONOUGH     PROCEDURE DATE:  03/15/2024          INTERPRETATION:  CLINICAL INFORMATION: eval kidney stone    stone hunt   protocol Admitting Dxs: A41.9 SEPSIS, UNSPECIFIED ORGANISM MCT    COMPARISON: 3.12.24.    CONTRAST/COMPLICATIONS:  IV Contrast: NONE  Oral Contrast: NONE  Complications: None reported at time of study completion    PROCEDURE:  CT of the Abdomen and Pelvis was performed.  Sagittal and coronal reformats wereperformed.    FINDINGS:    LOWER CHEST: Small pleural effusions.  Scattered reticular opacities and   bibasilar subsegmental atelectasis again noted.    LIVER: Within normal limits.  BILE DUCTS: Normal caliber.  GALLBLADDER: Within normal limits.  SPLEEN: Within normal limits.  PANCREAS: Within normal limits.  ADRENALS: Within normal limits.  KIDNEYS/URETERS: A 2 mm nonobstructing right renal calculus. Left   parapelvic cysts. Mild dilatation left renal pelvis is unchanged.    BLADDER: Within normal limits.  REPRODUCTIVE ORGANS: Within normal limits.    BOWEL: No bowel obstruction. The appendix is normal.  PERITONEUM: No ascites.  VESSELS:  Within normal limits.  RETROPERITONEUM/LYMPH NODES: No lymphadenopathy.  ABDOMINAL WALL: Within normal limits.  BONES: Right hip arthroplasty.    IMPRESSION: A 2 mm nonobstructing right renal calculus. Mild dilatation   left renal pelvis is unchanged.        --- End of Report ---            ELSY JOHANSEN MD; Attending Radiologist  This document has been electronically signed. Mar 15 2024  2:59PM    < end of copied text >        Plan:  - Patient stone passed per CT imaging.   - Trend WBC, improving.  - Monitor patients hemodynamics, fevers and WBC   - Fu ID recommendations, continue abx   - No further urological intervention  - Patient can follow up with [facility] as an outpatient for further stone management and prevention    Discussed with Dr. Malik   [facility]  [address] [place] [phone]

## 2024-03-15 NOTE — PROGRESS NOTE ADULT - TIME BILLING
Reviewing the chart, interpreting lab data, discussing case with family, interview and examination of patient, and documentation.

## 2024-03-16 LAB
ADD ON TEST-SPECIMEN IN LAB: SIGNIFICANT CHANGE UP
ANION GAP SERPL CALC-SCNC: 12 MMOL/L — SIGNIFICANT CHANGE UP (ref 5–17)
BILIRUB DIRECT SERPL-MCNC: <0.1 MG/DL — SIGNIFICANT CHANGE UP (ref 0–0.3)
BUN SERPL-MCNC: 16 MG/DL — SIGNIFICANT CHANGE UP (ref 7–23)
CALCIUM SERPL-MCNC: 8.9 MG/DL — SIGNIFICANT CHANGE UP (ref 8.4–10.5)
CHLORIDE SERPL-SCNC: 103 MMOL/L — SIGNIFICANT CHANGE UP (ref 96–108)
CO2 SERPL-SCNC: 24 MMOL/L — SIGNIFICANT CHANGE UP (ref 22–31)
CREAT SERPL-MCNC: 0.83 MG/DL — SIGNIFICANT CHANGE UP (ref 0.5–1.3)
EGFR: 70 ML/MIN/1.73M2 — SIGNIFICANT CHANGE UP
GLUCOSE SERPL-MCNC: 102 MG/DL — HIGH (ref 70–99)
HCT VFR BLD CALC: 27.8 % — LOW (ref 34.5–45)
HGB BLD-MCNC: 8.9 G/DL — LOW (ref 11.5–15.5)
MCHC RBC-ENTMCNC: 19.9 PG — LOW (ref 27–34)
MCHC RBC-ENTMCNC: 32 GM/DL — SIGNIFICANT CHANGE UP (ref 32–36)
MCV RBC AUTO: 62.2 FL — LOW (ref 80–100)
NRBC # BLD: 0 /100 WBCS — SIGNIFICANT CHANGE UP (ref 0–0)
PLATELET # BLD AUTO: 249 K/UL — SIGNIFICANT CHANGE UP (ref 150–400)
POTASSIUM SERPL-MCNC: 3.6 MMOL/L — SIGNIFICANT CHANGE UP (ref 3.5–5.3)
POTASSIUM SERPL-SCNC: 3.6 MMOL/L — SIGNIFICANT CHANGE UP (ref 3.5–5.3)
RBC # BLD: 4.47 M/UL — SIGNIFICANT CHANGE UP (ref 3.8–5.2)
RBC # FLD: 15.1 % — HIGH (ref 10.3–14.5)
SODIUM SERPL-SCNC: 139 MMOL/L — SIGNIFICANT CHANGE UP (ref 135–145)
WBC # BLD: 11.8 K/UL — HIGH (ref 3.8–10.5)
WBC # FLD AUTO: 11.8 K/UL — HIGH (ref 3.8–10.5)

## 2024-03-16 RX ADMIN — Medication 3 MILLILITER(S): at 17:20

## 2024-03-16 RX ADMIN — LATANOPROST 1 DROP(S): 0.05 SOLUTION/ DROPS OPHTHALMIC; TOPICAL at 21:42

## 2024-03-16 RX ADMIN — Medication 3 MILLILITER(S): at 05:12

## 2024-03-16 RX ADMIN — Medication 3 MILLILITER(S): at 23:31

## 2024-03-16 RX ADMIN — TAMSULOSIN HYDROCHLORIDE 0.4 MILLIGRAM(S): 0.4 CAPSULE ORAL at 21:41

## 2024-03-16 RX ADMIN — ATORVASTATIN CALCIUM 20 MILLIGRAM(S): 80 TABLET, FILM COATED ORAL at 21:41

## 2024-03-16 RX ADMIN — Medication 1 DROP(S): at 05:14

## 2024-03-16 RX ADMIN — Medication 1 DROP(S): at 17:20

## 2024-03-16 RX ADMIN — ENOXAPARIN SODIUM 40 MILLIGRAM(S): 100 INJECTION SUBCUTANEOUS at 05:13

## 2024-03-16 RX ADMIN — CHLORHEXIDINE GLUCONATE 1 APPLICATION(S): 213 SOLUTION TOPICAL at 11:18

## 2024-03-16 RX ADMIN — CEFTRIAXONE 100 MILLIGRAM(S): 500 INJECTION, POWDER, FOR SOLUTION INTRAMUSCULAR; INTRAVENOUS at 08:04

## 2024-03-16 RX ADMIN — Medication 3 MILLILITER(S): at 11:18

## 2024-03-16 NOTE — PROGRESS NOTE ADULT - PROBLEM SELECTOR PLAN 2
continue ceftriaxone IV while inpatient   continue to monitor  no intervention at this time per urology  repeat CT noncontrast noted  non-obstructing 2 mm right kidney stone  left kidney no stone visualized unchanged mild hydro  no intervention at this time per urology

## 2024-03-17 LAB
ADD ON TEST-SPECIMEN IN LAB: SIGNIFICANT CHANGE UP
ALBUMIN SERPL ELPH-MCNC: 3.1 G/DL — LOW (ref 3.3–5)
ALP SERPL-CCNC: 141 U/L — HIGH (ref 40–120)
ALT FLD-CCNC: 104 U/L — HIGH (ref 10–45)
ANION GAP SERPL CALC-SCNC: 14 MMOL/L — SIGNIFICANT CHANGE UP (ref 5–17)
AST SERPL-CCNC: 55 U/L — HIGH (ref 10–40)
BILIRUB SERPL-MCNC: 0.5 MG/DL — SIGNIFICANT CHANGE UP (ref 0.2–1.2)
BUN SERPL-MCNC: 18 MG/DL — SIGNIFICANT CHANGE UP (ref 7–23)
CALCIUM SERPL-MCNC: 8.9 MG/DL — SIGNIFICANT CHANGE UP (ref 8.4–10.5)
CHLORIDE SERPL-SCNC: 100 MMOL/L — SIGNIFICANT CHANGE UP (ref 96–108)
CO2 SERPL-SCNC: 22 MMOL/L — SIGNIFICANT CHANGE UP (ref 22–31)
CREAT SERPL-MCNC: 0.75 MG/DL — SIGNIFICANT CHANGE UP (ref 0.5–1.3)
CULTURE RESULTS: SIGNIFICANT CHANGE UP
CULTURE RESULTS: SIGNIFICANT CHANGE UP
EGFR: 79 ML/MIN/1.73M2 — SIGNIFICANT CHANGE UP
GLUCOSE SERPL-MCNC: 101 MG/DL — HIGH (ref 70–99)
HCT VFR BLD CALC: 29.5 % — LOW (ref 34.5–45)
HGB BLD-MCNC: 9.3 G/DL — LOW (ref 11.5–15.5)
MCHC RBC-ENTMCNC: 19.9 PG — LOW (ref 27–34)
MCHC RBC-ENTMCNC: 31.5 GM/DL — LOW (ref 32–36)
MCV RBC AUTO: 63.2 FL — LOW (ref 80–100)
NRBC # BLD: 0 /100 WBCS — SIGNIFICANT CHANGE UP (ref 0–0)
NT-PROBNP SERPL-SCNC: 395 PG/ML — HIGH (ref 0–300)
PLATELET # BLD AUTO: 220 K/UL — SIGNIFICANT CHANGE UP (ref 150–400)
POTASSIUM SERPL-MCNC: 3.9 MMOL/L — SIGNIFICANT CHANGE UP (ref 3.5–5.3)
POTASSIUM SERPL-SCNC: 3.9 MMOL/L — SIGNIFICANT CHANGE UP (ref 3.5–5.3)
PROT SERPL-MCNC: 6.2 G/DL — SIGNIFICANT CHANGE UP (ref 6–8.3)
RBC # BLD: 4.67 M/UL — SIGNIFICANT CHANGE UP (ref 3.8–5.2)
RBC # FLD: 15.4 % — HIGH (ref 10.3–14.5)
SARS-COV-2 RNA SPEC QL NAA+PROBE: SIGNIFICANT CHANGE UP
SODIUM SERPL-SCNC: 136 MMOL/L — SIGNIFICANT CHANGE UP (ref 135–145)
SPECIMEN SOURCE: SIGNIFICANT CHANGE UP
SPECIMEN SOURCE: SIGNIFICANT CHANGE UP
WBC # BLD: 11.75 K/UL — HIGH (ref 3.8–10.5)
WBC # FLD AUTO: 11.75 K/UL — HIGH (ref 3.8–10.5)

## 2024-03-17 RX ORDER — CEFUROXIME AXETIL 250 MG
500 TABLET ORAL EVERY 12 HOURS
Refills: 0 | Status: DISCONTINUED | OUTPATIENT
Start: 2024-03-18 | End: 2024-03-19

## 2024-03-17 RX ORDER — FUROSEMIDE 40 MG
20 TABLET ORAL ONCE
Refills: 0 | Status: COMPLETED | OUTPATIENT
Start: 2024-03-17 | End: 2024-03-17

## 2024-03-17 RX ADMIN — Medication 3 MILLILITER(S): at 17:09

## 2024-03-17 RX ADMIN — ATORVASTATIN CALCIUM 20 MILLIGRAM(S): 80 TABLET, FILM COATED ORAL at 22:24

## 2024-03-17 RX ADMIN — Medication 3 MILLILITER(S): at 11:00

## 2024-03-17 RX ADMIN — Medication 1 DROP(S): at 17:09

## 2024-03-17 RX ADMIN — Medication 3 MILLILITER(S): at 05:22

## 2024-03-17 RX ADMIN — CHLORHEXIDINE GLUCONATE 1 APPLICATION(S): 213 SOLUTION TOPICAL at 11:03

## 2024-03-17 RX ADMIN — TAMSULOSIN HYDROCHLORIDE 0.4 MILLIGRAM(S): 0.4 CAPSULE ORAL at 22:24

## 2024-03-17 RX ADMIN — Medication 20 MILLIGRAM(S): at 16:08

## 2024-03-17 RX ADMIN — CEFTRIAXONE 100 MILLIGRAM(S): 500 INJECTION, POWDER, FOR SOLUTION INTRAMUSCULAR; INTRAVENOUS at 08:11

## 2024-03-17 RX ADMIN — ENOXAPARIN SODIUM 40 MILLIGRAM(S): 100 INJECTION SUBCUTANEOUS at 05:21

## 2024-03-17 RX ADMIN — LATANOPROST 1 DROP(S): 0.05 SOLUTION/ DROPS OPHTHALMIC; TOPICAL at 22:25

## 2024-03-17 RX ADMIN — Medication 1 DROP(S): at 05:21

## 2024-03-17 RX ADMIN — Medication 3 MILLILITER(S): at 23:16

## 2024-03-17 NOTE — PROGRESS NOTE ADULT - PROBLEM SELECTOR PLAN 2
completed ceftriaxone IV   will switch to Ceftin 500 BID x 3 more days starting tomorrow   repeat CT noncontrast noted  non-obstructing 2 mm right kidney stone  left kidney no stone visualized unchanged mild hydro  no intervention at this time per urology

## 2024-03-18 ENCOUNTER — RESULT REVIEW (OUTPATIENT)
Age: 84
End: 2024-03-18

## 2024-03-18 LAB
ANION GAP SERPL CALC-SCNC: 12 MMOL/L — SIGNIFICANT CHANGE UP (ref 5–17)
BUN SERPL-MCNC: 21 MG/DL — SIGNIFICANT CHANGE UP (ref 7–23)
CALCIUM SERPL-MCNC: 9.1 MG/DL — SIGNIFICANT CHANGE UP (ref 8.4–10.5)
CHLORIDE SERPL-SCNC: 100 MMOL/L — SIGNIFICANT CHANGE UP (ref 96–108)
CO2 SERPL-SCNC: 26 MMOL/L — SIGNIFICANT CHANGE UP (ref 22–31)
CREAT SERPL-MCNC: 0.85 MG/DL — SIGNIFICANT CHANGE UP (ref 0.5–1.3)
EGFR: 68 ML/MIN/1.73M2 — SIGNIFICANT CHANGE UP
GLUCOSE SERPL-MCNC: 108 MG/DL — HIGH (ref 70–99)
NT-PROBNP SERPL-SCNC: 189 PG/ML — SIGNIFICANT CHANGE UP (ref 0–300)
POTASSIUM SERPL-MCNC: 3.7 MMOL/L — SIGNIFICANT CHANGE UP (ref 3.5–5.3)
POTASSIUM SERPL-SCNC: 3.7 MMOL/L — SIGNIFICANT CHANGE UP (ref 3.5–5.3)
SODIUM SERPL-SCNC: 138 MMOL/L — SIGNIFICANT CHANGE UP (ref 135–145)

## 2024-03-18 PROCEDURE — 93306 TTE W/DOPPLER COMPLETE: CPT | Mod: 26

## 2024-03-18 PROCEDURE — 99232 SBSQ HOSP IP/OBS MODERATE 35: CPT

## 2024-03-18 RX ORDER — FUROSEMIDE 40 MG
20 TABLET ORAL DAILY
Refills: 0 | Status: DISCONTINUED | OUTPATIENT
Start: 2024-03-18 | End: 2024-03-19

## 2024-03-18 RX ADMIN — Medication 3 MILLILITER(S): at 05:26

## 2024-03-18 RX ADMIN — Medication 3 MILLILITER(S): at 19:27

## 2024-03-18 RX ADMIN — TAMSULOSIN HYDROCHLORIDE 0.4 MILLIGRAM(S): 0.4 CAPSULE ORAL at 21:25

## 2024-03-18 RX ADMIN — LATANOPROST 1 DROP(S): 0.05 SOLUTION/ DROPS OPHTHALMIC; TOPICAL at 21:25

## 2024-03-18 RX ADMIN — Medication 500 MILLIGRAM(S): at 19:28

## 2024-03-18 RX ADMIN — ENOXAPARIN SODIUM 40 MILLIGRAM(S): 100 INJECTION SUBCUTANEOUS at 05:26

## 2024-03-18 RX ADMIN — Medication 650 MILLIGRAM(S): at 16:01

## 2024-03-18 RX ADMIN — Medication 20 MILLIGRAM(S): at 13:06

## 2024-03-18 RX ADMIN — Medication 1 DROP(S): at 05:26

## 2024-03-18 RX ADMIN — Medication 500 MILLIGRAM(S): at 05:26

## 2024-03-18 RX ADMIN — CHLORHEXIDINE GLUCONATE 1 APPLICATION(S): 213 SOLUTION TOPICAL at 11:18

## 2024-03-18 RX ADMIN — ATORVASTATIN CALCIUM 20 MILLIGRAM(S): 80 TABLET, FILM COATED ORAL at 21:25

## 2024-03-18 RX ADMIN — Medication 1 DROP(S): at 21:26

## 2024-03-18 RX ADMIN — Medication 3 MILLILITER(S): at 13:06

## 2024-03-18 NOTE — PROGRESS NOTE ADULT - PROBLEM SELECTOR PLAN 2
completed ceftriaxone IV   will switch to Ceftin 500 BID x 3 more days starting tomorrow   repeat CT noncontrast noted  non-obstructing 2 mm right kidney stone  no intervention at this time per urology

## 2024-03-19 ENCOUNTER — TRANSCRIPTION ENCOUNTER (OUTPATIENT)
Age: 84
End: 2024-03-19

## 2024-03-19 VITALS
DIASTOLIC BLOOD PRESSURE: 73 MMHG | RESPIRATION RATE: 18 BRPM | TEMPERATURE: 98 F | HEART RATE: 76 BPM | SYSTOLIC BLOOD PRESSURE: 114 MMHG | OXYGEN SATURATION: 90 %

## 2024-03-19 PROBLEM — E78.5 HYPERLIPIDEMIA, UNSPECIFIED: Chronic | Status: ACTIVE | Noted: 2024-03-10

## 2024-03-19 PROBLEM — H35.30 UNSPECIFIED MACULAR DEGENERATION: Chronic | Status: ACTIVE | Noted: 2024-03-10

## 2024-03-19 PROBLEM — Z86.2 PERSONAL HISTORY OF DISEASES OF THE BLOOD AND BLOOD-FORMING ORGANS AND CERTAIN DISORDERS INVOLVING THE IMMUNE MECHANISM: Chronic | Status: ACTIVE | Noted: 2024-03-10

## 2024-03-19 LAB
ALBUMIN SERPL ELPH-MCNC: 3.4 G/DL — SIGNIFICANT CHANGE UP (ref 3.3–5)
ALP SERPL-CCNC: 126 U/L — HIGH (ref 40–120)
ALT FLD-CCNC: 68 U/L — HIGH (ref 10–45)
ANION GAP SERPL CALC-SCNC: 12 MMOL/L — SIGNIFICANT CHANGE UP (ref 5–17)
AST SERPL-CCNC: 27 U/L — SIGNIFICANT CHANGE UP (ref 10–40)
BILIRUB SERPL-MCNC: 0.6 MG/DL — SIGNIFICANT CHANGE UP (ref 0.2–1.2)
BUN SERPL-MCNC: 22 MG/DL — SIGNIFICANT CHANGE UP (ref 7–23)
CALCIUM SERPL-MCNC: 9.1 MG/DL — SIGNIFICANT CHANGE UP (ref 8.4–10.5)
CHLORIDE SERPL-SCNC: 100 MMOL/L — SIGNIFICANT CHANGE UP (ref 96–108)
CO2 SERPL-SCNC: 26 MMOL/L — SIGNIFICANT CHANGE UP (ref 22–31)
CREAT SERPL-MCNC: 0.87 MG/DL — SIGNIFICANT CHANGE UP (ref 0.5–1.3)
EGFR: 66 ML/MIN/1.73M2 — SIGNIFICANT CHANGE UP
GLUCOSE SERPL-MCNC: 104 MG/DL — HIGH (ref 70–99)
POTASSIUM SERPL-MCNC: 3.7 MMOL/L — SIGNIFICANT CHANGE UP (ref 3.5–5.3)
POTASSIUM SERPL-SCNC: 3.7 MMOL/L — SIGNIFICANT CHANGE UP (ref 3.5–5.3)
PROT SERPL-MCNC: 6.8 G/DL — SIGNIFICANT CHANGE UP (ref 6–8.3)
SODIUM SERPL-SCNC: 138 MMOL/L — SIGNIFICANT CHANGE UP (ref 135–145)

## 2024-03-19 PROCEDURE — 83880 ASSAY OF NATRIURETIC PEPTIDE: CPT

## 2024-03-19 PROCEDURE — 83735 ASSAY OF MAGNESIUM: CPT

## 2024-03-19 PROCEDURE — 84100 ASSAY OF PHOSPHORUS: CPT

## 2024-03-19 PROCEDURE — 87186 SC STD MICRODIL/AGAR DIL: CPT

## 2024-03-19 PROCEDURE — 87637 SARSCOV2&INF A&B&RSV AMP PRB: CPT

## 2024-03-19 PROCEDURE — 80048 BASIC METABOLIC PNL TOTAL CA: CPT

## 2024-03-19 PROCEDURE — 85025 COMPLETE CBC W/AUTO DIFF WBC: CPT

## 2024-03-19 PROCEDURE — 96374 THER/PROPH/DIAG INJ IV PUSH: CPT

## 2024-03-19 PROCEDURE — 99285 EMERGENCY DEPT VISIT HI MDM: CPT | Mod: 25

## 2024-03-19 PROCEDURE — 84295 ASSAY OF SERUM SODIUM: CPT

## 2024-03-19 PROCEDURE — 82330 ASSAY OF CALCIUM: CPT

## 2024-03-19 PROCEDURE — 82435 ASSAY OF BLOOD CHLORIDE: CPT

## 2024-03-19 PROCEDURE — 71250 CT THORAX DX C-: CPT | Mod: MC

## 2024-03-19 PROCEDURE — 82803 BLOOD GASES ANY COMBINATION: CPT

## 2024-03-19 PROCEDURE — 87086 URINE CULTURE/COLONY COUNT: CPT

## 2024-03-19 PROCEDURE — A9585: CPT

## 2024-03-19 PROCEDURE — C8929: CPT

## 2024-03-19 PROCEDURE — 82550 ASSAY OF CK (CPK): CPT

## 2024-03-19 PROCEDURE — 97162 PT EVAL MOD COMPLEX 30 MIN: CPT

## 2024-03-19 PROCEDURE — 80074 ACUTE HEPATITIS PANEL: CPT

## 2024-03-19 PROCEDURE — 99232 SBSQ HOSP IP/OBS MODERATE 35: CPT

## 2024-03-19 PROCEDURE — 82248 BILIRUBIN DIRECT: CPT

## 2024-03-19 PROCEDURE — 87899 AGENT NOS ASSAY W/OPTIC: CPT

## 2024-03-19 PROCEDURE — 96375 TX/PRO/DX INJ NEW DRUG ADDON: CPT

## 2024-03-19 PROCEDURE — 74177 CT ABD & PELVIS W/CONTRAST: CPT | Mod: MC

## 2024-03-19 PROCEDURE — 70450 CT HEAD/BRAIN W/O DYE: CPT | Mod: MC

## 2024-03-19 PROCEDURE — 94640 AIRWAY INHALATION TREATMENT: CPT

## 2024-03-19 PROCEDURE — 97530 THERAPEUTIC ACTIVITIES: CPT

## 2024-03-19 PROCEDURE — 87640 STAPH A DNA AMP PROBE: CPT

## 2024-03-19 PROCEDURE — 85014 HEMATOCRIT: CPT

## 2024-03-19 PROCEDURE — 87635 SARS-COV-2 COVID-19 AMP PRB: CPT

## 2024-03-19 PROCEDURE — 84132 ASSAY OF SERUM POTASSIUM: CPT

## 2024-03-19 PROCEDURE — 85730 THROMBOPLASTIN TIME PARTIAL: CPT

## 2024-03-19 PROCEDURE — 80053 COMPREHEN METABOLIC PANEL: CPT

## 2024-03-19 PROCEDURE — 87077 CULTURE AEROBIC IDENTIFY: CPT

## 2024-03-19 PROCEDURE — 72197 MRI PELVIS W/O & W/DYE: CPT | Mod: MC

## 2024-03-19 PROCEDURE — 74176 CT ABD & PELVIS W/O CONTRAST: CPT | Mod: MC

## 2024-03-19 PROCEDURE — 76705 ECHO EXAM OF ABDOMEN: CPT

## 2024-03-19 PROCEDURE — 85027 COMPLETE CBC AUTOMATED: CPT

## 2024-03-19 PROCEDURE — 87449 NOS EACH ORGANISM AG IA: CPT

## 2024-03-19 PROCEDURE — 81001 URINALYSIS AUTO W/SCOPE: CPT

## 2024-03-19 PROCEDURE — 82947 ASSAY GLUCOSE BLOOD QUANT: CPT

## 2024-03-19 PROCEDURE — 85018 HEMOGLOBIN: CPT

## 2024-03-19 PROCEDURE — 71045 X-RAY EXAM CHEST 1 VIEW: CPT

## 2024-03-19 PROCEDURE — 85610 PROTHROMBIN TIME: CPT

## 2024-03-19 PROCEDURE — 97116 GAIT TRAINING THERAPY: CPT

## 2024-03-19 PROCEDURE — 87150 DNA/RNA AMPLIFIED PROBE: CPT

## 2024-03-19 PROCEDURE — 83605 ASSAY OF LACTIC ACID: CPT

## 2024-03-19 PROCEDURE — 82962 GLUCOSE BLOOD TEST: CPT

## 2024-03-19 PROCEDURE — 36415 COLL VENOUS BLD VENIPUNCTURE: CPT

## 2024-03-19 PROCEDURE — 87040 BLOOD CULTURE FOR BACTERIA: CPT

## 2024-03-19 PROCEDURE — 87641 MR-STAPH DNA AMP PROBE: CPT

## 2024-03-19 RX ORDER — LATANOPROST 0.05 MG/ML
1 SOLUTION/ DROPS OPHTHALMIC; TOPICAL
Refills: 0 | DISCHARGE

## 2024-03-19 RX ORDER — SENNA PLUS 8.6 MG/1
2 TABLET ORAL
Qty: 0 | Refills: 0 | DISCHARGE
Start: 2024-03-19

## 2024-03-19 RX ORDER — ATORVASTATIN CALCIUM 80 MG/1
1 TABLET, FILM COATED ORAL
Qty: 0 | Refills: 0 | DISCHARGE
Start: 2024-03-19

## 2024-03-19 RX ORDER — POLYETHYLENE GLYCOL 3350 17 G/17G
17 POWDER, FOR SOLUTION ORAL
Qty: 0 | Refills: 0 | DISCHARGE
Start: 2024-03-19

## 2024-03-19 RX ORDER — FUROSEMIDE 40 MG
20 TABLET ORAL DAILY
Refills: 0 | Status: DISCONTINUED | OUTPATIENT
Start: 2024-03-20 | End: 2024-03-19

## 2024-03-19 RX ORDER — CELECOXIB 200 MG/1
1 CAPSULE ORAL
Refills: 0 | DISCHARGE

## 2024-03-19 RX ORDER — FUROSEMIDE 40 MG
1 TABLET ORAL
Qty: 0 | Refills: 0 | DISCHARGE
Start: 2024-03-19

## 2024-03-19 RX ORDER — TAMSULOSIN HYDROCHLORIDE 0.4 MG/1
1 CAPSULE ORAL
Qty: 0 | Refills: 0 | DISCHARGE
Start: 2024-03-19

## 2024-03-19 RX ORDER — TIMOLOL 0.5 %
1 DROPS OPHTHALMIC (EYE)
Qty: 0 | Refills: 0 | DISCHARGE
Start: 2024-03-19

## 2024-03-19 RX ORDER — TIMOLOL 0.5 %
1 DROPS OPHTHALMIC (EYE)
Refills: 0 | DISCHARGE

## 2024-03-19 RX ORDER — CEFUROXIME AXETIL 250 MG
1 TABLET ORAL
Qty: 0 | Refills: 0 | DISCHARGE
Start: 2024-03-19

## 2024-03-19 RX ORDER — LATANOPROST 0.05 MG/ML
1 SOLUTION/ DROPS OPHTHALMIC; TOPICAL
Qty: 0 | Refills: 0 | DISCHARGE
Start: 2024-03-19

## 2024-03-19 RX ORDER — ATORVASTATIN CALCIUM 80 MG/1
1 TABLET, FILM COATED ORAL
Refills: 0 | DISCHARGE

## 2024-03-19 RX ORDER — IPRATROPIUM/ALBUTEROL SULFATE 18-103MCG
3 AEROSOL WITH ADAPTER (GRAM) INHALATION
Qty: 0 | Refills: 0 | DISCHARGE
Start: 2024-03-19

## 2024-03-19 RX ADMIN — Medication 3 MILLILITER(S): at 11:32

## 2024-03-19 RX ADMIN — Medication 1 DROP(S): at 05:02

## 2024-03-19 RX ADMIN — ENOXAPARIN SODIUM 40 MILLIGRAM(S): 100 INJECTION SUBCUTANEOUS at 05:02

## 2024-03-19 RX ADMIN — Medication 20 MILLIGRAM(S): at 05:02

## 2024-03-19 RX ADMIN — CHLORHEXIDINE GLUCONATE 1 APPLICATION(S): 213 SOLUTION TOPICAL at 11:33

## 2024-03-19 RX ADMIN — Medication 500 MILLIGRAM(S): at 05:02

## 2024-03-19 RX ADMIN — Medication 3 MILLILITER(S): at 01:04

## 2024-03-19 RX ADMIN — Medication 3 MILLILITER(S): at 05:02

## 2024-03-19 NOTE — DISCHARGE NOTE PROVIDER - PROVIDER TOKENS
PROVIDER:[TOKEN:[[id number]],FOLLOWUP:[1 week]],PROVIDER:[TOKEN:[[id number]],FOLLOWUP:[1-3 days]] PROVIDER:[TOKEN:[[id number]],FOLLOWUP:[1 week]],PROVIDER:[TOKEN:[[id number]],FOLLOWUP:[1-3 days]],PROVIDER:[TOKEN:[[id number]]]

## 2024-03-19 NOTE — DISCHARGE NOTE NURSING/CASE MANAGEMENT/SOCIAL WORK - NSDCPEFALRISK_GEN_ALL_CORE
For information on Fall & Injury Prevention, visit: [email or web address] OR  [email or web address] OR  https://www.cdc.gov/steadi/patient.html

## 2024-03-19 NOTE — DISCHARGE NOTE PROVIDER - HOSPITAL COURSE
HPI:  Pt is an 84yo F w/ PMH of HLD, macular degeneration, thalassemia minor and melanoma pw AMS and hypoxia.     Hx obtained from daughter at bedside along w/ patient.     Reports feeling well yesterday w/out acute symptoms. Today daughter was unable to reach pt so had neighbor perform wellness check at which point pt was found altered in her bed for which EMS was called.     Patient denies any acute symptoms such as F/C, CP, SOB, cough, nasal congestion/rhinorrhea, abd pain, N/V, constipation or diarrhea. Does wear depends when sleeping d/t incontinence but denies any dysuria. She was otherwise unable to recall the events that transpired this afternoon.     In the ED febrile, tachy w/ hypoxia (80% on RA per EMS) w/ lactic acidosis w/ positive UA and CXR w/ c/f PNA vs atelectasis s/p Vanc, Zosyn, 2L IVF and HFNC for hypoxia.    (10 Mar 2024 22:10)    Hospital Course: Patient admitted with severe sepsis i/s/o UTI +/- PNA.   > Sepsis with acute hypoxic respiratory failure without septic shock, due to unspecified organism.    Plan: on admission likely secondary to sepsis secondary  to UTI and bacteremia now resolved   she had some basal crackles  yesterday which have resolved since after furosemide 20 IVP x 1  echocardiogram noted   normal LV function  likely has an element of HFpEF  gentle diuresis with furosemide 20 po qd on discharge.    >  Acute UTI.   ·  Plan: completed ceftriaxone IV   will switch to Ceftin 500 BID x 3 more days starting tomorrow   repeat CT noncontrast noted  non-obstructing 2 mm right kidney stone  no intervention at this time per urology.  infectious disease consulted    >  Thrombocytopenia.   ·  Plan: resolved   likely secondary to sepsis on admission     >  HLD (hyperlipidemia).   ·  Plan: continue home Atorvastatin 20mg qhs  compliance reinforced at length.    >  Macular degeneration.   ·  Plan: continue home eye drops.    > AMS  plan - Ucx with E. coli   blood cx with + GNR   + leukocytosis   CTH with L middle frontal hyperattenuation likely mineralization.  Bilateral occipital encephalomalacia   CT chest with B/L lower lobe opacities c/f atelectasis and/or PNA    CT abd: mild L hydro; superficial errosion of gastric fundus. endometrial thickening   MR pelvix done   Neurology consulted      Important Medication Changes and Reason:    Active or Pending Issues Requiring Follow-up:    Advanced Directives:   [ X] Full code  [ ] DNR  [ ] Hospice    Discharge Diagnoses:  Severe Sepsis  UTI  PNA  AMS

## 2024-03-19 NOTE — DISCHARGE NOTE PROVIDER - CARE PROVIDERS DIRECT ADDRESSES
,DirectAddress_Unknown,[email or web address] ,DirectAddress_Unknown,[email or web address]

## 2024-03-19 NOTE — PROGRESS NOTE ADULT - PROBLEM SELECTOR PROBLEM 3
Thrombocytopenia
R/O Pneumonia
Thrombocytopenia
R/O Pneumonia
Thrombocytopenia

## 2024-03-19 NOTE — PROGRESS NOTE ADULT - PROBLEM SELECTOR PROBLEM 5
Macular degeneration

## 2024-03-19 NOTE — PROGRESS NOTE ADULT - PROBLEM SELECTOR PLAN 3
resolved   likely secondary to sepsis on admission   continue to monitor as outpatient
CT chest with possible bibasilar PNA vs. atelectasis   -Initially requiring HFNC, now on nasal cannula  -Duoneb q6h  -MRSA/MSSA nasal PCR negative   -Already on abx for bacteremia   -Incentive spirometry use encouraged  -Hypoxia improving. Keep sats >90% with o2 PRN (currently RA)   -CXR with some improvement in bibasilar opacities/atelectasis  -D/c planning per primary team
CT chest with possible bibasilar PNA vs. atelectasis   -Initially requiring HFNC, now on nasal cannula  -o2 lowered from 2LNC to 1LNC. Continue to wean o2 as tolerated, keep sats >90%.  -Duoneb q6h  -MRSA/MSSA nasal PCR negative   -Continue abx.  -Incentive spirometry use encouraged
CT chest with possible bibasilar PNA vs. atelectasis   -Initially requiring HFNC, now on nasal cannula  -o2 lowered from 4LNC to 2LNC. Continue to wean o2 as tolerated, keep sats >90%.  -Duoneb q6h  -MRSA/MSSA nasal PCR negative   -Continue abx.  -Please start incentive spirometry
stable counts   continue to monitor   continue enoxaparin
CT chest with possible bibasilar PNA vs. atelectasis   -Initially requiring HFNC, now on nasal cannula  -Wean o2 as tolerated, keep sats >90% (currently 4LNC)   -Duoneb q6h  -MRSA/MSSA nasal PCR negative   -Continue abx.  -Start incentive spirometry
CT chest with possible bibasilar PNA vs. atelectasis   -Initially requiring HFNC, now on nasal cannula  -Continue to wean o2 as tolerated, keep sats >90% (currently 2LNC)  -Duoneb q6h  -MRSA/MSSA nasal PCR negative   -Continue abx.  -Incentive spirometry use encouraged  -F/u CXR ordered
CT chest with possible bibasilar PNA vs. atelectasis   -Initially requiring HFNC  -Duoneb q6h, change to q6h PRN on discharge   -MRSA/MSSA nasal PCR negative   -Already on abx for bacteremia   -Incentive spirometry use encouraged  -Hypoxia improving. Keep sats >90% with o2 PRN (currently RA)   -CXR with some improvement in bibasilar opacities/atelectasis  -D/c planning per primary team
resolved   continue to monitor   continue enoxaparin
resolved   likely secondary to sepsis on admission   continue to monitor   continue enoxaparin
resolved   likely secondary to sepsis on admission   continue to monitor   continue enoxaparin
resolved   continue to monitor   continue enoxaparin
stable counts normal today   continue to monitor   continue enoxaparin

## 2024-03-19 NOTE — PROGRESS NOTE ADULT - PROBLEM SELECTOR PROBLEM 1
Sepsis with acute hypoxic respiratory failure without septic shock, due to unspecified organism
UTI (urinary tract infection)
Sepsis with acute hypoxic respiratory failure without septic shock, due to unspecified organism
Sepsis with acute hypoxic respiratory failure without septic shock, due to unspecified organism
UTI (urinary tract infection)
UTI (urinary tract infection)
Sepsis with acute hypoxic respiratory failure without septic shock, due to unspecified organism
UTI (urinary tract infection)
Sepsis with acute hypoxic respiratory failure without septic shock, due to unspecified organism

## 2024-03-19 NOTE — DISCHARGE NOTE PROVIDER - CARE PROVIDER_API CALL
Elias Leggett  Neurology  [address], [address]  [place] [address]  Phone: [phone]  Fax: [phone]  Follow Up Time: 1 week    Naseem Flores  Cardiovascular Disease  [address]  [place] [address]  Phone: [phone]  Fax: [phone]  Follow Up Time: 1-3 days   Elias Leggett  Neurology  [address], [address]  [place] [address]  Phone: [phone]  Fax: [phone]  Follow Up Time: 1 week    Naseem Flores  Cardiovascular Disease  [address]  [place] [address]  Phone: [phone]  Fax: [phone]  Follow Up Time: 1-3 days    Ed Montoya  Infectious Disease  [address] [place] [id number]  Phone: [phone]  Fax: [phone]  Follow Up Time:

## 2024-03-19 NOTE — PROGRESS NOTE ADULT - PROBLEM SELECTOR PLAN 5
continue home eye drops
- c/w home eye drops
continue home eye drops

## 2024-03-19 NOTE — PROGRESS NOTE ADULT - PROBLEM SELECTOR PROBLEM 2
Bacteremia
Acute UTI
Bacteremia
Acute UTI
Bacteremia
Acute UTI
Bacteremia
Acute UTI
Acute UTI

## 2024-03-19 NOTE — DISCHARGE NOTE PROVIDER - NSDCCPCAREPLAN_GEN_ALL_CORE_FT
PRINCIPAL DISCHARGE DIAGNOSIS  Diagnosis: Sepsis  Assessment and Plan of Treatment: resolved      SECONDARY DISCHARGE DIAGNOSES  Diagnosis: UTI (urinary tract infection)  Assessment and Plan of Treatment:     Diagnosis: Pneumonia  Assessment and Plan of Treatment: s/p ABT    Diagnosis: Acute UTI  Assessment and Plan of Treatment: Continue ABT as ordered    Diagnosis: Sepsis with acute hypoxic respiratory failure without septic shock, due to unspecified organism  Assessment and Plan of Treatment:     Diagnosis: AMS (altered mental status)  Assessment and Plan of Treatment:

## 2024-03-19 NOTE — PROGRESS NOTE ADULT - PROBLEM SELECTOR PLAN 2
completed ceftriaxone IV   will switch to Ceftin 500 BID x 3 more days starting tomorrow   complete treatment po  I prefer to stay within the same class of antibiotics to avoid exposure to a different class of antibiotics and potential resistance next time

## 2024-03-19 NOTE — PROGRESS NOTE ADULT - SUBJECTIVE AND OBJECTIVE BOX
DATE OF SERVICE: 03-11-24 @ 12:46  CHIEF COMPLAINT:Patient is a 83y old  Female who presents with a chief complaint of Urosepsis (11 Mar 2024 08:57)    	        PAST MEDICAL & SURGICAL HISTORY:  HLD (hyperlipidemia)      Macular degeneration      H/O thalassemia minor      S/P cataract surgery      S/P hip replacement              REVIEW OF SYSTEMS:  some confusion  RESPIRATORY: cough /   CARDIOVASCULAR: No chest pain, palpitations,   GASTROINTESTINAL: No abdominal or epigastric pain.     NEUROLOGICAL: No headaches,     Medications:  MEDICATIONS  (STANDING):  albuterol/ipratropium for Nebulization 3 milliLiter(s) Nebulizer every 6 hours  atorvastatin 20 milliGRAM(s) Oral at bedtime  chlorhexidine 2% Cloths 1 Application(s) Topical daily  enoxaparin Injectable 40 milliGRAM(s) SubCutaneous every 24 hours  latanoprost 0.005% Ophthalmic Solution 1 Drop(s) Both EYES at bedtime  piperacillin/tazobactam IVPB.. 3.375 Gram(s) IV Intermittent every 8 hours  timolol 0.5% Solution 1 Drop(s) Both EYES two times a day    MEDICATIONS  (PRN):  acetaminophen     Tablet .. 650 milliGRAM(s) Oral every 6 hours PRN Temp greater or equal to 38C (100.4F), Mild Pain (1 - 3)    	    PHYSICAL EXAM:  T(C): 36.3 (03-11-24 @ 11:30), Max: 39.5 (03-10-24 @ 14:25)  HR: 87 (03-11-24 @ 11:30) (87 - 116)  BP: 115/70 (03-11-24 @ 11:30) (91/62 - 149/62)  RR: 18 (03-11-24 @ 11:30) (16 - 26)  SpO2: 93% (03-11-24 @ 11:30) (93% - 98%)  Wt(kg): --  I&O's Summary      Appearance: Normal	  HEENT:   Normal oral mucosa, PERRL, EOMI	    Cardiovascular: Normal S1 S2, No JVD,   Respiratory: dec bs   Gastrointestinal:  Soft, Non-tender, + BS	    Neurologic: Non-focal  Extremities: Normal range of motion,     TELEMETRY: 	    ECG:  	  RADIOLOGY:  OTHER: 	  	  LABS:	 	    CARDIAC MARKERS:  CARDIAC MARKERS ( 10 Mar 2024 14:30 )  x     / x     / 41 U/L / x     / x                                    9.7    27.92 )-----------( 138      ( 11 Mar 2024 06:58 )             31.0     03-11    140  |  107  |  33<H>  ----------------------------<  117<H>  4.1   |  20<L>  |  1.05    Ca    8.4      11 Mar 2024 06:58  Phos  3.7     03-11  Mg     1.6     03-11    TPro  6.7  /  Alb  4.0  /  TBili  1.2  /  DBili  x   /  AST  43<H>  /  ALT  43  /  AlkPhos  106  03-10    proBNP:   Lipid Profile:   HgA1c:   TSH:     	        
Follow-up Pulm Progress Note    No new respiratory events overnight.  Denies SOB/CP.   Sats 92% RA    Medications:  MEDICATIONS  (STANDING):  albuterol/ipratropium for Nebulization 3 milliLiter(s) Nebulizer every 6 hours  atorvastatin 20 milliGRAM(s) Oral at bedtime  cefuroxime   Tablet 500 milliGRAM(s) Oral every 12 hours  chlorhexidine 2% Cloths 1 Application(s) Topical daily  enoxaparin Injectable 40 milliGRAM(s) SubCutaneous every 24 hours  latanoprost 0.005% Ophthalmic Solution 1 Drop(s) Both EYES at bedtime  senna 2 Tablet(s) Oral at bedtime  tamsulosin 0.4 milliGRAM(s) Oral at bedtime  timolol 0.5% Solution 1 Drop(s) Both EYES two times a day    MEDICATIONS  (PRN):  acetaminophen     Tablet .. 650 milliGRAM(s) Oral every 6 hours PRN Temp greater or equal to 38C (100.4F), Mild Pain (1 - 3)  ondansetron Injectable 4 milliGRAM(s) IV Push every 6 hours PRN Nausea and/or Vomiting  polyethylene glycol 3350 17 Gram(s) Oral daily PRN Constipation          Vital Signs Last 24 Hrs  T(C): 36.9 (18 Mar 2024 08:25), Max: 37.3 (17 Mar 2024 15:43)  T(F): 98.4 (18 Mar 2024 08:25), Max: 99.1 (17 Mar 2024 15:43)  HR: 74 (18 Mar 2024 08:25) (74 - 90)  BP: 129/70 (18 Mar 2024 08:25) (111/64 - 130/64)  BP(mean): --  RR: 18 (18 Mar 2024 08:25) (18 - 18)  SpO2: 93% (18 Mar 2024 08:25) (90% - 98%)    Parameters below as of 18 Mar 2024 08:25  Patient On (Oxygen Delivery Method): nasal cannula  O2 Flow (L/min): 2            03-17 @ 07:01  -  03-18 @ 07:00  --------------------------------------------------------  IN: 50 mL / OUT: 1950 mL / NET: -1900 mL          LABS:                        9.3    11.75 )-----------( 220      ( 17 Mar 2024 07:34 )             29.5     03-18    138  |  100  |  21  ----------------------------<  108<H>  3.7   |  26  |  0.85    Ca    9.1      18 Mar 2024 07:18    TPro  6.2  /  Alb  3.1<L>  /  TBili  0.5  /  DBili  x   /  AST  55<H>  /  ALT  104<H>  /  AlkPhos  141<H>  03-17          CAPILLARY BLOOD GLUCOSE          Urinalysis Basic - ( 18 Mar 2024 07:18 )    Color: x / Appearance: x / SG: x / pH: x  Gluc: 108 mg/dL / Ketone: x  / Bili: x / Urobili: x   Blood: x / Protein: x / Nitrite: x   Leuk Esterase: x / RBC: x / WBC x   Sq Epi: x / Non Sq Epi: x / Bacteria: x                CULTURES:    Culture - Blood (collected 03-12-24 @ 08:10)  Source: .Blood Blood-Peripheral  Final Report (03-17-24 @ 17:00):    No growth at 5 days    Culture - Blood (collected 03-12-24 @ 08:00)  Source: .Blood Blood-Peripheral  Final Report (03-17-24 @ 17:00):    No growth at 5 days    Culture - Blood (collected 03-10-24 @ 14:25)  Source: .Blood Blood-Peripheral  Gram Stain (03-11-24 @ 04:31):    Growth in aerobic bottle: Gram Negative Rods    Growth in anaerobic bottle: Gram Negative Rods  Final Report (03-12-24 @ 11:44):    Growth in aerobic and anaerobic bottles: Escherichia coli    See previous culture [id number]    Culture - Blood (collected 03-10-24 @ 14:20)  Source: .Blood Blood-Peripheral  Gram Stain (03-11-24 @ 04:30):    Growth in aerobic bottle: Gram Negative Rods    Growth in anaerobic bottle: Gram Negative Rods  Final Report (03-12-24 @ 11:43):    Growth in aerobic and anaerobic bottles: Escherichia coli    Direct identification is available within approximately 3-5    hours either by Blood Panel Multiplexed PCR or Direct    MALDI-TOF. Details: [email or web address]  Organism: Blood Culture PCR  Escherichia coli (03-12-24 @ 11:43)  Organism: Escherichia coli (03-12-24 @ 11:43)      Method Type: RAFFAELE      -  Ampicillin: S <=8 These ampicillin results predict results for amoxicillin      -  Ampicillin/Sulbactam: S <=4/2      -  Aztreonam: S <=4      -  Cefazolin: S <=2      -  Cefepime: S <=2      -  Cefoxitin: S <=8      -  Ceftriaxone: S <=1      -  Ciprofloxacin: S <=0.25      -  Ertapenem: S <=0.5      -  Gentamicin: S <=2      -  Imipenem: S <=1      -  Levofloxacin: S <=0.5      -  Meropenem: S <=1      -  Piperacillin/Tazobactam: S <=8      -  Tobramycin: S <=2      -  Trimethoprim/Sulfamethoxazole: S <=0.5/9.5  Organism: Blood Culture PCR (03-12-24 @ 11:43)      Method Type: PCR      -  Escherichia coli: Detec        Culture - Urine (collected 03-10-24 @ 15:07)  Source: Clean Catch Clean Catch (Midstream)  Final Report (03-13-24 @ 09:25):    >100,000 CFU/ml Escherichia coli  Organism: Escherichia coli (03-13-24 @ 09:25)  Organism: Escherichia coli (03-13-24 @ 09:25)      Method Type: RAFFAELE      -  Amoxicillin/Clavulanic Acid: S <=8/4      -  Ampicillin: S <=8 These ampicillin results predict results for amoxicillin      -  Ampicillin/Sulbactam: S <=4/2      -  Aztreonam: S <=4      -  Cefazolin: S <=2 For uncomplicated UTI with K. pneumoniae, E. coli, or P. mirablis: RAFFAELE <=16 is sensitive and RAFFAELE >=32 is resistant. This also predicts results for oral agents cefaclor, cefdinir, cefpodoxime, cefprozil, cefuroxime axetil, cephalexin and locarbef for uncomplicated UTI. Note that some isolates may be susceptible to these agents while testing resistant to cefazolin.      -  Cefepime: S <=2      -  Cefoxitin: S <=8      -  Ceftriaxone: S <=1      -  Cefuroxime: S <=4      -  Ciprofloxacin: S <=0.25      -  Ertapenem: S <=0.5      -  Gentamicin: S <=2      -  Imipenem: S <=1      -  Levofloxacin: S <=0.5      -  Meropenem: S <=1      -  Nitrofurantoin: S <=32 Should not be used to treat pyelonephritis      -  Piperacillin/Tazobactam: S <=8      -  Tobramycin: S <=2      -  Trimethoprim/Sulfamethoxazole: S <=0.5/9.5                      Physical Examination:  PULM: grossly clear   CVS: S1, S2 heard    RADIOLOGY REVIEWED    CT chest:   < from: CT Chest No Cont (03.11.24 @ 11:27) >  FINDINGS:    LUNGS AND AIRWAYS: Compressed bilateral lower lobe subsegmental bronchi.    Mosaic attenuation pattern of the lungs likely related to expiratory   phase imaging. Bilateral lower lobe dense opacities, left greater than   right. Bilateral scattered calcified granulomas..  PLEURA: Trace left pleural effusion.  MEDIASTINUM AND KELLY: No lymphadenopathy.  VESSELS: Aortic and coronary artery atherosclerotic calcifications.  HEART: Cardiomegaly. No pericardial effusion. Aortic valve calcifications.  CHEST WALL AND LOWER NECK: Within normal limits.  VISUALIZED UPPER ABDOMEN: Small hiatal hernia.Nonspecific mild partially   included left perinephric fat stranding.  BONES: Degenerative changes.    IMPRESSION:  Bilateral lower lobe opacities, which may represent atelectasis and/or   pneumonia.    Traceleft pleural effusion.    < end of copied text >
Neurology     S: patient seen and examined.  daughter at bedside       Medications: MEDICATIONS  (STANDING):  albuterol/ipratropium for Nebulization 3 milliLiter(s) Nebulizer every 6 hours  atorvastatin 20 milliGRAM(s) Oral at bedtime  cefTRIAXone   IVPB      chlorhexidine 2% Cloths 1 Application(s) Topical daily  enoxaparin Injectable 40 milliGRAM(s) SubCutaneous every 24 hours  latanoprost 0.005% Ophthalmic Solution 1 Drop(s) Both EYES at bedtime  senna 2 Tablet(s) Oral at bedtime  timolol 0.5% Solution 1 Drop(s) Both EYES two times a day    MEDICATIONS  (PRN):  acetaminophen     Tablet .. 650 milliGRAM(s) Oral every 6 hours PRN Temp greater or equal to 38C (100.4F), Mild Pain (1 - 3)  ondansetron Injectable 4 milliGRAM(s) IV Push every 6 hours PRN Nausea and/or Vomiting  polyethylene glycol 3350 17 Gram(s) Oral daily PRN Constipation       Vitals:  Vital Signs Last 24 Hrs  T(C): 36.7 (12 Mar 2024 11:10), Max: 36.7 (11 Mar 2024 23:54)  T(F): 98 (12 Mar 2024 11:10), Max: 98 (11 Mar 2024 23:54)  HR: 82 (12 Mar 2024 11:10) (82 - 86)  BP: 148/78 (12 Mar 2024 11:10) (118/72 - 148/78)  BP(mean): --  RR: 18 (12 Mar 2024 11:10) (18 - 18)  SpO2: 96% (12 Mar 2024 11:10) (95% - 96%)    Parameters below as of 12 Mar 2024 11:10  Patient On (Oxygen Delivery Method): nasal cannula  O2 Flow (L/min): 4            General Exam:   General Appearance: Appropriately dressed and in no acute distress       Head: Normocephalic, atraumatic and no dysmorphic features  Ear, Nose, and Throat: Moist mucous membranes  CVS: S1S2+  Resp: No SOB, no wheeze or rhonchi  GI: soft NT/ND  Extremities: No edema or cyanosis  Skin: No bruises or rashes     Neurological Exam:  Mental Status: Awake, alert and oriented x 1-2.  Able to follow simple  verbal commands. Able to name and repeat. fluent speech. No obvious aphasia mild dysarthria noted.   Cranial Nerves: PERRL, EOMI, decrase visual acuity bilateraly., sensation V1-V3 intact,  no obvious facial asymmetry, equal elevation of palate, scm/trap 5/5, tongue is midline on protrusion. no obvious papilledema on fundoscopic exam. hearing is grossly intact.   Motor: LACEY at least 4/5 throughout   Sensation: Intact to light touch   throughout.    Coordination: No dysmetria on FNF    Gait: cane/walker at baseline     Data/Labs/Imaging which I personally reviewed.     Labs:     LABS:                          10.3   30.42 )-----------( 135      ( 12 Mar 2024 07:16 )             33.4     03-12    141  |  106  |  29<H>  ----------------------------<  75  3.6   |  21<L>  |  0.94    Ca    8.7      12 Mar 2024 07:16  Phos  2.5     03-12  Mg     1.9     03-12    TPro  6.0  /  Alb  3.2<L>  /  TBili  0.7  /  DBili  x   /  AST  25  /  ALT  25  /  AlkPhos  123<H>  03-12    LIVER FUNCTIONS - ( 12 Mar 2024 07:16 )  Alb: 3.2 g/dL / Pro: 6.0 g/dL / ALK PHOS: 123 U/L / ALT: 25 U/L / AST: 25 U/L / GGT: x             Urinalysis Basic - ( 12 Mar 2024 07:16 )    Color: x / Appearance: x / SG: x / pH: x  Gluc: 75 mg/dL / Ketone: x  / Bili: x / Urobili: x   Blood: x / Protein: x / Nitrite: x   Leuk Esterase: x / RBC: x / WBC x   Sq Epi: x / Non Sq Epi: x / Bacteria: x            < from: CT Head No Cont (03.10.24 @ 16:30) >    ACC: [id number] EXAM:  CT BRAIN   ORDERED BY: KO THIBODEAUX     PROCEDURE DATE:  03/10/2024          INTERPRETATION:  HISTORY: Altered mental status.    COMPARISON: None available.    TECHNIQUE: Axial noncontrast CT images from the skull base to the vertex   were obtained and submitted for interpretation. Coronal and sagittal   reformatted images were performed. Bone and soft tissue windows were   evaluated.    FINDINGS:    There is no acute intracranial mass-effect, hemorrhage, midline shift, or  abnormal extra-axial fluid collection. Gray-white differentiation is   maintained.    Encephalomalacia and volume loss in the occipital lobes bilaterally.   Patchy and confluent periventricular and deep cerebral white matter   hypoattenuation due to chronic microangiopathic ischemic changes.   Atheromatous calcifications along the carotid siphons.    There is trace hyperattenuation along the left middle frontal cortex,   likely reflecting gyriform mineralization.    Moderate centrally predominant cerebral volume loss. No hydrocephalus.   Basal cisterns are patent.    Visualized paranasal sinuses and mastoid air cells are clear. Right   maria bullosa noted. Bilateral cataract surgery. Calvarium is intact.    IMPRESSION:    Trace hyperattenuation along the left middle frontal cortex, likely   reflecting gyriform mineralization. No other suspicion of acute   intracranial bleeding.    Bilateral occipital encephalomalacia.  Central volume loss and advanced microvascular ischemic changes.    --- End of Report ---            KAE TAYLOR MD; Attending Radiologist  This document has been electronically signed. Mar 10 2024  4:57PM    < end of copied text >      
Urology Progress Note     Subjective/24hour Events:   Patient seen and examined.   No acute events overnight. Discussed presence of stone at L UVJ. Denies pain, fevers etc.  Pain controlled.     Vital Signs:  Vital Signs Last 24 Hrs  T(C): 36.8 (14 Mar 2024 09:03), Max: 36.9 (14 Mar 2024 00:28)  T(F): 98.3 (14 Mar 2024 09:03), Max: 98.4 (14 Mar 2024 00:28)  HR: 77 (14 Mar 2024 09:03) (77 - 91)  BP: 152/80 (14 Mar 2024 09:03) (139/69 - 152/80)  BP(mean): --  RR: 18 (14 Mar 2024 09:03) (18 - 18)  SpO2: 95% (14 Mar 2024 09:03) (93% - 95%)    Parameters below as of 14 Mar 2024 09:03  Patient On (Oxygen Delivery Method): nasal cannula  O2 Flow (L/min): 2      CAPILLARY BLOOD GLUCOSE          I&O's Detail    13 Mar 2024 07:01  -  14 Mar 2024 07:00  --------------------------------------------------------  IN:    Oral Fluid: 560 mL  Total IN: 560 mL    OUT:    Voided (mL): 600 mL  Total OUT: 600 mL    Total NET: -40 mL      14 Mar 2024 07:01  -  14 Mar 2024 09:53  --------------------------------------------------------  IN:    Oral Fluid: 240 mL  Total IN: 240 mL    OUT:    Voided (mL): 200 mL  Total OUT: 200 mL    Total NET: 40 mL          MEDICATIONS  (STANDING):  albuterol/ipratropium for Nebulization 3 milliLiter(s) Nebulizer every 6 hours  atorvastatin 20 milliGRAM(s) Oral at bedtime  cefTRIAXone   IVPB 1000 milliGRAM(s) IV Intermittent every 24 hours  cefTRIAXone   IVPB      chlorhexidine 2% Cloths 1 Application(s) Topical daily  enoxaparin Injectable 40 milliGRAM(s) SubCutaneous every 24 hours  latanoprost 0.005% Ophthalmic Solution 1 Drop(s) Both EYES at bedtime  senna 2 Tablet(s) Oral at bedtime  tamsulosin 0.4 milliGRAM(s) Oral at bedtime  timolol 0.5% Solution 1 Drop(s) Both EYES two times a day    MEDICATIONS  (PRN):  acetaminophen     Tablet .. 650 milliGRAM(s) Oral every 6 hours PRN Temp greater or equal to 38C (100.4F), Mild Pain (1 - 3)  ondansetron Injectable 4 milliGRAM(s) IV Push every 6 hours PRN Nausea and/or Vomiting  polyethylene glycol 3350 17 Gram(s) Oral daily PRN Constipation      Physical Exam:  Gen: NAD.  Lungs: Non labored breathing.   Ab: Soft, nontender, nondistended. No CVAT  : VOiding freely     Labs:    03-14    139  |  105  |  21  ----------------------------<  90  3.8   |  25  |  0.86    Ca    9.0      14 Mar 2024 06:58    TPro  5.6<L>  /  Alb  3.1<L>  /  TBili  0.4  /  DBili  x   /  AST  69<H>  /  ALT  106<H>  /  AlkPhos  231<H>  03-14    LIVER FUNCTIONS - ( 14 Mar 2024 06:58 )  Alb: 3.1 g/dL / Pro: 5.6 g/dL / ALK PHOS: 231 U/L / ALT: 106 U/L / AST: 69 U/L / GGT: x                                 9.0    14.19 )-----------( 150      ( 14 Mar 2024 06:59 )             28.6         
infectious diseases progress note:    Patient is a 83y old  Female who presents with a chief complaint of Urosepsis (11 Mar 2024 13:39)        Sepsis           s    Allergies    No Known Allergies    Intolerances        ANTIBIOTICS/RELEVANT:  antimicrobials  piperacillin/tazobactam IVPB.. 3.375 Gram(s) IV Intermittent every 8 hours    immunologic:    OTHER:  acetaminophen     Tablet .. 650 milliGRAM(s) Oral every 6 hours PRN  albuterol/ipratropium for Nebulization 3 milliLiter(s) Nebulizer every 6 hours  atorvastatin 20 milliGRAM(s) Oral at bedtime  chlorhexidine 2% Cloths 1 Application(s) Topical daily  enoxaparin Injectable 40 milliGRAM(s) SubCutaneous every 24 hours  latanoprost 0.005% Ophthalmic Solution 1 Drop(s) Both EYES at bedtime  timolol 0.5% Solution 1 Drop(s) Both EYES two times a day      Objective:  Vital Signs Last 24 Hrs  T(C): 36.7 (11 Mar 2024 23:54), Max: 36.7 (11 Mar 2024 16:23)  T(F): 98 (11 Mar 2024 23:54), Max: 98 (11 Mar 2024 16:23)  HR: 86 (11 Mar 2024 23:54) (84 - 90)  BP: 118/72 (11 Mar 2024 23:54) (115/70 - 132/75)  BP(mean): --  RR: 18 (11 Mar 2024 23:54) (18 - 21)  SpO2: 95% (11 Mar 2024 23:54) (93% - 96%)    Parameters below as of 11 Mar 2024 23:54  Patient On (Oxygen Delivery Method): nasal cannula  O2 Flow (L/min): 4         Eyes:GRACE, EOMI  Ear/Nose/Throat: no oral lesion, no sinus tenderness on percussion	  Neck:no JVD, no lymphadenopathy, supple  Respiratory: CTA zakiya  Cardiovascular: S1S2 RRR, no murmurs  Gastrointestinal:soft, (+) BS, no HSM  Extremities:no e/e/c        LABS:                        10.3   30.42 )-----------( 135      ( 12 Mar 2024 07:16 )             33.4     03-12    141  |  106  |  29<H>  ----------------------------<  75  3.6   |  21<L>  |  0.94    Ca    8.7      12 Mar 2024 07:16  Phos  2.5     03-12  Mg     1.9     03-12    TPro  6.0  /  Alb  3.2<L>  /  TBili  0.7  /  DBili  x   /  AST  25  /  ALT  25  /  AlkPhos  123<H>  03-12    PT/INR - ( 10 Mar 2024 14:30 )   PT: 12.0 sec;   INR: 1.15 ratio         PTT - ( 10 Mar 2024 14:30 )  PTT:32.6 sec  Urinalysis Basic - ( 12 Mar 2024 07:16 )    Color: x / Appearance: x / SG: x / pH: x  Gluc: 75 mg/dL / Ketone: x  / Bili: x / Urobili: x   Blood: x / Protein: x / Nitrite: x   Leuk Esterase: x / RBC: x / WBC x   Sq Epi: x / Non Sq Epi: x / Bacteria: x          MICROBIOLOGY:    RECENT CULTURES:  03-10 @ 15:07 Clean Catch Clean Catch (Midstream)                >100,000 CFU/ml Escherichia coli    03-10 @ 14:25 .Blood Blood-Peripheral       Growth in aerobic bottle: Gram Negative Rods  Growth in anaerobic bottle: Gram Negative Rods           Growth in aerobic and anaerobic bottles: Escherichia coli  See previous culture [id number]    03-10 @ 14:20 .Blood Blood-Peripheral   PCR    Growth in aerobic bottle: Gram Negative Rods  Growth in anaerobic bottle: Gram Negative Rods    Blood Culture PCR  Blood Culture PCR     Growth in aerobic and anaerobic bottles: Escherichia coli  Direct identification is available within approximately 3-5  hours either by Blood Panel Multiplexed PCR or Direct  MALDI-TOF. Details: [email or web address]          RESPIRATORY CULTURES:              RADIOLOGY & ADDITIONAL STUDIES:        Pager [phone]  After 5 pm/weekends or if no response :[phone]
infectious diseases progress note:    Patient is a 83y old  Female who presents with a chief complaint of Urosepsis (12 Mar 2024 14:17)        Sepsis          ROS:  CONSTITUTIONAL:  Negative fever or chills, feels well, good appetite  EYES:  Negative  blurry vision or double vision  CARDIOVASCULAR:  Negative for chest pain or palpitations  RESPIRATORY:  Negative for cough, wheezing, or SOB   GASTROINTESTINAL:  Negative for nausea, vomiting, diarrhea, constipation, or abdominal pain  GENITOURINARY:  Negative frequency, urgency or dysuria  NEUROLOGIC:  No headache, confusion, dizziness, lightheadedness    Allergies    No Known Allergies    Intolerances        ANTIBIOTICS/RELEVANT:  antimicrobials  cefTRIAXone   IVPB 1000 milliGRAM(s) IV Intermittent every 24 hours  cefTRIAXone   IVPB        immunologic:    OTHER:  acetaminophen     Tablet .. 650 milliGRAM(s) Oral every 6 hours PRN  albuterol/ipratropium for Nebulization 3 milliLiter(s) Nebulizer every 6 hours  atorvastatin 20 milliGRAM(s) Oral at bedtime  chlorhexidine 2% Cloths 1 Application(s) Topical daily  enoxaparin Injectable 40 milliGRAM(s) SubCutaneous every 24 hours  latanoprost 0.005% Ophthalmic Solution 1 Drop(s) Both EYES at bedtime  ondansetron Injectable 4 milliGRAM(s) IV Push every 6 hours PRN  polyethylene glycol 3350 17 Gram(s) Oral daily PRN  senna 2 Tablet(s) Oral at bedtime  timolol 0.5% Solution 1 Drop(s) Both EYES two times a day      Objective:  Vital Signs Last 24 Hrs  T(C): 37 (12 Mar 2024 23:38), Max: 37 (12 Mar 2024 23:38)  T(F): 98.6 (12 Mar 2024 23:38), Max: 98.6 (12 Mar 2024 23:38)  HR: 80 (12 Mar 2024 23:38) (80 - 82)  BP: 136/75 (12 Mar 2024 23:38) (136/75 - 148/78)  BP(mean): --  RR: 18 (12 Mar 2024 23:38) (18 - 18)  SpO2: 96% (12 Mar 2024 23:38) (96% - 96%)    Parameters below as of 12 Mar 2024 23:38  Patient On (Oxygen Delivery Method): nasal cannula  O2 Flow (L/min): 4      PH   Eyes:GRACE, EOMI  Ear/Nose/Throat: no oral lesion, no sinus tenderness on percussion	  Neck:no JVD, no lymphadenopathy, supple  Respiratory: CTA zakiya  Cardiovascular: S1S2 RRR, no murmurs  Gastrointestinal:soft, (+) BS, no HSM  Extremities:no e/e/c        LABS:                        10.0   23.63 )-----------( 124      ( 13 Mar 2024 07:11 )             31.0     03-12    141  |  106  |  29<H>  ----------------------------<  75  3.6   |  21<L>  |  0.94    Ca    8.7      12 Mar 2024 07:16  Phos  2.5     03-12  Mg     1.9     03-12    TPro  6.0  /  Alb  3.2<L>  /  TBili  0.7  /  DBili  x   /  AST  25  /  ALT  25  /  AlkPhos  123<H>  03-12      Urinalysis Basic - ( 12 Mar 2024 07:16 )    Color: x / Appearance: x / SG: x / pH: x  Gluc: 75 mg/dL / Ketone: x  / Bili: x / Urobili: x   Blood: x / Protein: x / Nitrite: x   Leuk Esterase: x / RBC: x / WBC x   Sq Epi: x / Non Sq Epi: x / Bacteria: x          MICROBIOLOGY:    RECENT CULTURES:  03-10 @ 15:07 Clean Catch Clean Catch (Midstream)                >100,000 CFU/ml Escherichia coli    03-10 @ 14:25 .Blood Blood-Peripheral       Growth in aerobic bottle: Gram Negative Rods  Growth in anaerobic bottle: Gram Negative Rods           Growth in aerobic and anaerobic bottles: Escherichia coli  See previous culture [id number]    03-10 @ 14:20 .Blood Blood-Peripheral   PCR    Growth in aerobic bottle: Gram Negative Rods  Growth in anaerobic bottle: Gram Negative Rods    Blood Culture PCR  Escherichia coli  Blood Culture PCR     Growth in aerobic and anaerobic bottles: Escherichia coli  Direct identification is available within approximately 3-5  hours either by Blood Panel Multiplexed PCR or Direct  MALDI-TOF. Details: [email or web address]          RESPIRATORY CULTURES:              RADIOLOGY & ADDITIONAL STUDIES:        Pager [phone]  After 5 pm/weekends or if no response :[phone]
infectious diseases progress note:    Patient is a 83y old  Female who presents with a chief complaint of Urosepsis (18 Mar 2024 15:56)        Sepsis             Allergies    No Known Allergies    Intolerances        ANTIBIOTICS/RELEVANT:  antimicrobials  cefuroxime   Tablet 500 milliGRAM(s) Oral every 12 hours    immunologic:    OTHER:  acetaminophen     Tablet .. 650 milliGRAM(s) Oral every 6 hours PRN  albuterol/ipratropium for Nebulization 3 milliLiter(s) Nebulizer every 6 hours  atorvastatin 20 milliGRAM(s) Oral at bedtime  chlorhexidine 2% Cloths 1 Application(s) Topical daily  enoxaparin Injectable 40 milliGRAM(s) SubCutaneous every 24 hours  furosemide   Injectable 20 milliGRAM(s) IV Push daily  latanoprost 0.005% Ophthalmic Solution 1 Drop(s) Both EYES at bedtime  ondansetron Injectable 4 milliGRAM(s) IV Push every 6 hours PRN  polyethylene glycol 3350 17 Gram(s) Oral daily PRN  senna 2 Tablet(s) Oral at bedtime  tamsulosin 0.4 milliGRAM(s) Oral at bedtime  timolol 0.5% Solution 1 Drop(s) Both EYES two times a day      Objective:  Vital Signs Last 24 Hrs  T(C): 36.6 (19 Mar 2024 08:24), Max: 36.8 (19 Mar 2024 00:37)  T(F): 97.9 (19 Mar 2024 08:24), Max: 98.2 (19 Mar 2024 00:37)  HR: 76 (19 Mar 2024 08:24) (76 - 89)  BP: 114/73 (19 Mar 2024 08:24) (102/68 - 114/73)  BP(mean): --  RR: 18 (19 Mar 2024 08:24) (18 - 18)  SpO2: 90% (19 Mar 2024 08:24) (90% - 95%)    Parameters below as of 19 Mar 2024 08:24  Patient On (Oxygen Delivery Method): room air           Eyes:GRACE, EOMI  Ear/Nose/Throat: no oral lesion, no sinus tenderness on percussion	  Neck:no JVD, no lymphadenopathy, supple  Respiratory: CTA zakiya  Cardiovascular: S1S2 RRR, no murmurs  Gastrointestinal:soft, (+) BS, no HSM  Extremities:no e/e/c        LABS:    03-19    138  |  100  |  22  ----------------------------<  104<H>  3.7   |  26  |  0.87    Ca    9.1      19 Mar 2024 06:57    TPro  6.8  /  Alb  3.4  /  TBili  0.6  /  DBili  x   /  AST  27  /  ALT  68<H>  /  AlkPhos  126<H>  03-19      Urinalysis Basic - ( 19 Mar 2024 06:57 )    Color: x / Appearance: x / SG: x / pH: x  Gluc: 104 mg/dL / Ketone: x  / Bili: x / Urobili: x   Blood: x / Protein: x / Nitrite: x   Leuk Esterase: x / RBC: x / WBC x   Sq Epi: x / Non Sq Epi: x / Bacteria: x          MICROBIOLOGY:    RECENT CULTURES:        RESPIRATORY CULTURES:              RADIOLOGY & ADDITIONAL STUDIES:        Pager [phone]  After 5 pm/weekends or if no response :[phone]
Neurology     S: patient seen and examined.  no neuro changes ; family at bedside       Medications:     Medications: MEDICATIONS  (STANDING):  albuterol/ipratropium for Nebulization 3 milliLiter(s) Nebulizer every 6 hours  atorvastatin 20 milliGRAM(s) Oral at bedtime  cefTRIAXone   IVPB 1000 milliGRAM(s) IV Intermittent every 24 hours  cefTRIAXone   IVPB      chlorhexidine 2% Cloths 1 Application(s) Topical daily  enoxaparin Injectable 40 milliGRAM(s) SubCutaneous every 24 hours  latanoprost 0.005% Ophthalmic Solution 1 Drop(s) Both EYES at bedtime  senna 2 Tablet(s) Oral at bedtime  tamsulosin 0.4 milliGRAM(s) Oral at bedtime  timolol 0.5% Solution 1 Drop(s) Both EYES two times a day    MEDICATIONS  (PRN):  acetaminophen     Tablet .. 650 milliGRAM(s) Oral every 6 hours PRN Temp greater or equal to 38C (100.4F), Mild Pain (1 - 3)  ondansetron Injectable 4 milliGRAM(s) IV Push every 6 hours PRN Nausea and/or Vomiting  polyethylene glycol 3350 17 Gram(s) Oral daily PRN Constipation       Vitals:  Vital Signs Last 24 Hrs  T(C): 37 (15 Mar 2024 04:40), Max: 37 (15 Mar 2024 04:40)  T(F): 98.6 (15 Mar 2024 04:40), Max: 98.6 (15 Mar 2024 04:40)  HR: 97 (15 Mar 2024 04:40) (97 - 97)  BP: 168/65 (15 Mar 2024 04:40) (168/65 - 168/65)  BP(mean): --  RR: 18 (15 Mar 2024 04:40) (18 - 18)  SpO2: 97% (15 Mar 2024 04:40) (97% - 97%)    Parameters below as of 15 Mar 2024 04:40  Patient On (Oxygen Delivery Method): nasal cannula  O2 Flow (L/min): 2              General Exam:   General Appearance: Appropriately dressed and in no acute distress       Head: Normocephalic, atraumatic and no dysmorphic features  Ear, Nose, and Throat: Moist mucous membranes  CVS: S1S2+  Resp: No SOB, no wheeze or rhonchi  GI: soft NT/ND  Extremities: No edema or cyanosis  Skin: No bruises or rashes     Neurological Exam:  Mental Status: Awake, alert and oriented x 1-2.  Able to follow simple  verbal commands. Able to name and repeat. fluent speech. No obvious aphasia mild dysarthria noted.   Cranial Nerves: PERRL, EOMI, decrase visual acuity bilateraly., sensation V1-V3 intact,  no obvious facial asymmetry, equal elevation of palate, scm/trap 5/5, tongue is midline on protrusion. no obvious papilledema on fundoscopic exam. hearing is grossly intact.   Motor: LACEY at least 4/5 throughout   Sensation: Intact to light touch   throughout.    Coordination: No dysmetria on FNF    Gait: cane/walker at baseline     Data/Labs/Imaging which I personally reviewed.          LABS:                          9.0    10.90 )-----------( 162      ( 15 Mar 2024 06:37 )             27.9     03-15    137  |  101  |  17  ----------------------------<  114<H>  3.5   |  25  |  0.79    Ca    8.8      15 Mar 2024 06:36    TPro  5.8<L>  /  Alb  3.0<L>  /  TBili  0.4  /  DBili  x   /  AST  48<H>  /  ALT  90<H>  /  AlkPhos  173<H>  03-15    LIVER FUNCTIONS - ( 15 Mar 2024 06:36 )  Alb: 3.0 g/dL / Pro: 5.8 g/dL / ALK PHOS: 173 U/L / ALT: 90 U/L / AST: 48 U/L / GGT: x             Urinalysis Basic - ( 15 Mar 2024 06:36 )    Color: x / Appearance: x / SG: x / pH: x  Gluc: 114 mg/dL / Ketone: x  / Bili: x / Urobili: x   Blood: x / Protein: x / Nitrite: x   Leuk Esterase: x / RBC: x / WBC x   Sq Epi: x / Non Sq Epi: x / Bacteria: x              < from: CT Head No Cont (03.10.24 @ 16:30) >    ACC: [id number] EXAM:  CT BRAIN   ORDERED BY: KO THIBODEAUX     PROCEDURE DATE:  03/10/2024          INTERPRETATION:  HISTORY: Altered mental status.    COMPARISON: None available.    TECHNIQUE: Axial noncontrast CT images from the skull base to the vertex   were obtained and submitted for interpretation. Coronal and sagittal   reformatted images were performed. Bone and soft tissue windows were   evaluated.    FINDINGS:    There is no acute intracranial mass-effect, hemorrhage, midline shift, or  abnormal extra-axial fluid collection. Gray-white differentiation is   maintained.    Encephalomalacia and volume loss in the occipital lobes bilaterally.   Patchy and confluent periventricular and deep cerebral white matter   hypoattenuation due to chronic microangiopathic ischemic changes.   Atheromatous calcifications along the carotid siphons.    There is trace hyperattenuation along the left middle frontal cortex,   likely reflecting gyriform mineralization.    Moderate centrally predominant cerebral volume loss. No hydrocephalus.   Basal cisterns are patent.    Visualized paranasal sinuses and mastoid air cells are clear. Right   maria bullosa noted. Bilateral cataract surgery. Calvarium is intact.    IMPRESSION:    Trace hyperattenuation along the left middle frontal cortex, likely   reflecting gyriform mineralization. No other suspicion of acute   intracranial bleeding.    Bilateral occipital encephalomalacia.  Central volume loss and advanced microvascular ischemic changes.    --- End of Report ---        < from: CT Abdomen and Pelvis w/ IV Cont (03.12.24 @ 10:10) >    ACC: [id number] EXAM:  CT ABDOMEN AND PELVIS IC   ORDERED BY: CAYLA PEREA     PROCEDURE DATE:  03/12/2024          INTERPRETATION:  CLINICAL INFORMATION: Escherichia coli sepsis.    COMPARISON: None.    CONTRAST/COMPLICATIONS:  IV Contrast: Omnipaque 350  90 cc administered   10 cc discarded  Oral Contrast: NONE  Complications: None reported at time of study completion    PROCEDURE:  CT of the Abdomen and Pelvis was performed.  Sagittal and coronal reformats were performed.    FINDINGS:  LOWER CHEST: Small bilateral pleural effusions, left greater than right   with associated basilar atelectasis. Cardiomegaly.    LIVER: Within normal limits.  BILE DUCTS: Normal caliber.  GALLBLADDER: Within normal limits.  SPLEEN: Within normal limits.  PANCREAS: Within normal limits.  ADRENALS: Within normal limits.  KIDNEYS/URETERS: 2 mm nonobstructing right renal calculus. Mild left   hydronephrosis with urothelial thickening especially of the renal pelvis   and bilateral perinephric edema. No ureteral dilatation.    BLADDER: Within normal limits.  REPRODUCTIVE ORGANS: The endometrium is prominent, possibly thickened at   the fundus. Correlate with pelvic ultrasound.    BOWEL: Small focus of fluid in the gastric fundus (3, 49), question   superficial erosion. No surrounding inflammatory changes. No bowel   obstruction. Appendix is normal.  PERITONEUM: No ascites.  VESSELS: Within normal limits.  RETROPERITONEUM/LYMPH NODES: No lymphadenopathy.  ABDOMINAL WALL: Within normal limits.  BONES:Degenerative changes. Right hip arthroplasty resulting in streak   artifact in the pelvis.    IMPRESSION:  Mild left hydronephrosis with urothelial thickening and bilateral   perinephric edema. Correlate with urinalysis for urinary tract infection.    Small fluid versus possible superficial erosion in the gastric fundus. No   surrounding inflammatory changes. Endoscopy would be more sensitive to   assess for peptic ulcer disease.    Endometrial thickening is questioned. Correlate with pelvic ultrasound.    --- End of Report ---            SORAIDA GIL MD; Attending Radiologist  This document has been electronically signed. Mar 12 2024  2:36PM    < end of copied text >  
Patient is a 83y old  Female who presents with a chief complaint of Urosepsis (15 Mar 2024 09:48)      DATE OF SERVICE: 03-15-24 @ 13:57    SUBJECTIVE / OVERNIGHT EVENTS: overnight events noted    ROS:  Resp: No cough no sputum production  CVS: No chest pain no palpitations no orthopnea  GI: no N/V/D  "I feel much better"         MEDICATIONS  (STANDING):  albuterol/ipratropium for Nebulization 3 milliLiter(s) Nebulizer every 6 hours  atorvastatin 20 milliGRAM(s) Oral at bedtime  cefTRIAXone   IVPB 1000 milliGRAM(s) IV Intermittent every 24 hours  cefTRIAXone   IVPB      chlorhexidine 2% Cloths 1 Application(s) Topical daily  enoxaparin Injectable 40 milliGRAM(s) SubCutaneous every 24 hours  latanoprost 0.005% Ophthalmic Solution 1 Drop(s) Both EYES at bedtime  senna 2 Tablet(s) Oral at bedtime  tamsulosin 0.4 milliGRAM(s) Oral at bedtime  timolol 0.5% Solution 1 Drop(s) Both EYES two times a day    MEDICATIONS  (PRN):  acetaminophen     Tablet .. 650 milliGRAM(s) Oral every 6 hours PRN Temp greater or equal to 38C (100.4F), Mild Pain (1 - 3)  ondansetron Injectable 4 milliGRAM(s) IV Push every 6 hours PRN Nausea and/or Vomiting  polyethylene glycol 3350 17 Gram(s) Oral daily PRN Constipation        CAPILLARY BLOOD GLUCOSE        I&O's Summary    14 Mar 2024 07:01  -  15 Mar 2024 07:00  --------------------------------------------------------  IN: 240 mL / OUT: 902 mL / NET: -662 mL        Vital Signs Last 24 Hrs  T(C): 37 (15 Mar 2024 04:40), Max: 37 (15 Mar 2024 04:40)  T(F): 98.6 (15 Mar 2024 04:40), Max: 98.6 (15 Mar 2024 04:40)  HR: 97 (15 Mar 2024 04:40) (97 - 97)  BP: 168/65 (15 Mar 2024 04:40) (168/65 - 168/65)  BP(mean): --  RR: 18 (15 Mar 2024 04:40) (18 - 18)  SpO2: 97% (15 Mar 2024 04:40) (97% - 97%)    PHYSICAL EXAM:  EYES: EOMI, PERRL  NECK: Supple, No JVD  CHEST/LUNG: improved basal crackles   HEART: S1 S2; no murmurs   ABDOMEN: Soft, Nontender  EXTREMITIES:  ,  no edema  NEUROLOGY: AO x 3 non-focal    LABS:                        9.0    10.90 )-----------( 162      ( 15 Mar 2024 06:37 )             27.9     03-15    137  |  101  |  17  ----------------------------<  114<H>  3.5   |  25  |  0.79    Ca    8.8      15 Mar 2024 06:36    TPro  5.8<L>  /  Alb  3.0<L>  /  TBili  0.4  /  DBili  x   /  AST  48<H>  /  ALT  90<H>  /  AlkPhos  173<H>  03-15          Urinalysis Basic - ( 15 Mar 2024 06:36 )    Color: x / Appearance: x / SG: x / pH: x  Gluc: 114 mg/dL / Ketone: x  / Bili: x / Urobili: x   Blood: x / Protein: x / Nitrite: x   Leuk Esterase: x / RBC: x / WBC x   Sq Epi: x / Non Sq Epi: x / Bacteria: x          All consultant(s) notes reviewed and care discussed with other providers        Contact Number, Dr Apple [phone]
  Infectious Diseases Follow Up:    Patient is a 83y old  Female who presents with a chief complaint of Urosepsis (18 Mar 2024 09:55)      Interval History/ROS:  No acute events, feeling a bit tried, but per daughter at bedside much approved, awaiting rehab    Allergies  No Known Allergies        ANTIMICROBIALS:  cefuroxime   Tablet 500 every 12 hours      Current Abx:     Previous Abx     OTHER MEDS:  MEDICATIONS  (STANDING):  acetaminophen     Tablet .. 650 every 6 hours PRN  albuterol/ipratropium for Nebulization 3 every 6 hours  atorvastatin 20 at bedtime  enoxaparin Injectable 40 every 24 hours  ondansetron Injectable 4 every 6 hours PRN  polyethylene glycol 3350 17 daily PRN  senna 2 at bedtime  tamsulosin 0.4 at bedtime      Vital Signs Last 24 Hrs  T(C): 36.9 (18 Mar 2024 08:25), Max: 37.3 (17 Mar 2024 15:43)  T(F): 98.4 (18 Mar 2024 08:25), Max: 99.1 (17 Mar 2024 15:43)  HR: 74 (18 Mar 2024 08:25) (74 - 90)  BP: 129/70 (18 Mar 2024 08:25) (111/64 - 130/64)  BP(mean): --  RR: 18 (18 Mar 2024 08:25) (18 - 18)  SpO2: 93% (18 Mar 2024 08:25) (90% - 98%)    Parameters below as of 18 Mar 2024 08:25  Patient On (Oxygen Delivery Method): nasal cannula  O2 Flow (L/min): 2      PHYSICAL EXAM:  GENERAL: NAD, well-developed  HEAD:  Atraumatic, Normocephalic  EYES: EOMI, conjunctiva and sclera clear  CHEST/LUNG: On RA, not in respiratory distress  PSYCH: AAOx3  NEUROLOGY: non-focal  SKIN: No rashes or lesions                          9.3    11.75 )-----------( 220      ( 17 Mar 2024 07:34 )             29.5       03-18    138  |  100  |  21  ----------------------------<  108<H>  3.7   |  26  |  0.85    Ca    9.1      18 Mar 2024 07:18    TPro  6.2  /  Alb  3.1<L>  /  TBili  0.5  /  DBili  x   /  AST  55<H>  /  ALT  104<H>  /  AlkPhos  141<H>  03-17      Urinalysis Basic - ( 18 Mar 2024 07:18 )    Color: x / Appearance: x / SG: x / pH: x  Gluc: 108 mg/dL / Ketone: x  / Bili: x / Urobili: x   Blood: x / Protein: x / Nitrite: x   Leuk Esterase: x / RBC: x / WBC x   Sq Epi: x / Non Sq Epi: x / Bacteria: x        MICROBIOLOGY:  v  .Blood Blood-Peripheral  03-12-24   No growth at 5 days  --  --      .Blood Blood-Peripheral  03-12-24   No growth at 5 days  --  --      Clean Catch Clean Catch (Midstream)  03-10-24   >100,000 CFU/ml Escherichia coli  --  Escherichia coli      .Blood Blood-Peripheral  03-10-24   Growth in aerobic and anaerobic bottles: Escherichia coli  See previous culture [id number]  --    Growth in aerobic bottle: Gram Negative Rods  Growth in anaerobic bottle: Gram Negative Rods      .Blood Blood-Peripheral  03-10-24   Growth in aerobic and anaerobic bottles: Escherichia coli  Direct identification is available within approximately 3-5  hours either by Blood Panel Multiplexed PCR or Direct  MALDI-TOF. Details: [email or web address]  --  Blood Culture PCR  Escherichia coli                RADIOLOGY:  < from: CT Abdomen and Pelvis No Cont (03.15.24 @ 13:57) >  FINDINGS:    LOWER CHEST: Small pleural effusions.  Scattered reticular opacities and   bibasilar subsegmental atelectasis again noted.    LIVER: Within normal limits.  BILE DUCTS: Normal caliber.  GALLBLADDER: Within normal limits.  SPLEEN: Within normal limits.  PANCREAS: Within normal limits.  ADRENALS: Within normal limits.  KIDNEYS/URETERS: A 2 mm nonobstructing right renal calculus. Left   parapelvic cysts. Mild dilatation left renal pelvis is unchanged.    BLADDER: Within normal limits.  REPRODUCTIVE ORGANS: Within normal limits.    BOWEL: No bowel obstruction. The appendix is normal.  PERITONEUM: No ascites.  VESSELS:  Within normal limits.  RETROPERITONEUM/LYMPH NODES: No lymphadenopathy.  ABDOMINAL WALL: Within normal limits.  BONES: Right hip arthroplasty.    IMPRESSION: A 2 mm nonobstructing right renal calculus. Mild dilatation   left renal pelvis is unchanged.      
Neurology     S: patient seen and examined.  no neuro changes       Medications: MEDICATIONS  (STANDING):  albuterol/ipratropium for Nebulization 3 milliLiter(s) Nebulizer every 6 hours  atorvastatin 20 milliGRAM(s) Oral at bedtime  cefTRIAXone   IVPB 1000 milliGRAM(s) IV Intermittent every 24 hours  cefTRIAXone   IVPB      chlorhexidine 2% Cloths 1 Application(s) Topical daily  enoxaparin Injectable 40 milliGRAM(s) SubCutaneous every 24 hours  latanoprost 0.005% Ophthalmic Solution 1 Drop(s) Both EYES at bedtime  senna 2 Tablet(s) Oral at bedtime  timolol 0.5% Solution 1 Drop(s) Both EYES two times a day    MEDICATIONS  (PRN):  acetaminophen     Tablet .. 650 milliGRAM(s) Oral every 6 hours PRN Temp greater or equal to 38C (100.4F), Mild Pain (1 - 3)  ondansetron Injectable 4 milliGRAM(s) IV Push every 6 hours PRN Nausea and/or Vomiting  polyethylene glycol 3350 17 Gram(s) Oral daily PRN Constipation       Vitals:  Vital Signs Last 24 Hrs  T(C): 36.3 (13 Mar 2024 09:28), Max: 37 (12 Mar 2024 23:38)  T(F): 97.4 (13 Mar 2024 09:28), Max: 98.6 (12 Mar 2024 23:38)  HR: 80 (13 Mar 2024 09:28) (80 - 82)  BP: 143/75 (13 Mar 2024 09:28) (136/75 - 148/78)  BP(mean): --  RR: 18 (13 Mar 2024 09:28) (18 - 18)  SpO2: 95% (13 Mar 2024 09:28) (94% - 96%)    Parameters below as of 13 Mar 2024 09:28  Patient On (Oxygen Delivery Method): nasal cannula  O2 Flow (L/min): 4              General Exam:   General Appearance: Appropriately dressed and in no acute distress       Head: Normocephalic, atraumatic and no dysmorphic features  Ear, Nose, and Throat: Moist mucous membranes  CVS: S1S2+  Resp: No SOB, no wheeze or rhonchi  GI: soft NT/ND  Extremities: No edema or cyanosis  Skin: No bruises or rashes     Neurological Exam:  Mental Status: Awake, alert and oriented x 1-2.  Able to follow simple  verbal commands. Able to name and repeat. fluent speech. No obvious aphasia mild dysarthria noted.   Cranial Nerves: PERRL, EOMI, decrase visual acuity bilateraly., sensation V1-V3 intact,  no obvious facial asymmetry, equal elevation of palate, scm/trap 5/5, tongue is midline on protrusion. no obvious papilledema on fundoscopic exam. hearing is grossly intact.   Motor: LACEY at least 4/5 throughout   Sensation: Intact to light touch   throughout.    Coordination: No dysmetria on FNF    Gait: cane/walker at baseline     Data/Labs/Imaging which I personally reviewed.       LABS:                          10.0   23.63 )-----------( 124      ( 13 Mar 2024 07:11 )             31.0     03-13    139  |  104  |  26<H>  ----------------------------<  85  3.9   |  23  |  0.96    Ca    9.0      13 Mar 2024 07:10  Phos  2.5     03-12  Mg     1.9     03-12    TPro  5.8<L>  /  Alb  3.0<L>  /  TBili  0.8  /  DBili  x   /  AST  74<H>  /  ALT  61<H>  /  AlkPhos  202<H>  03-13    LIVER FUNCTIONS - ( 13 Mar 2024 07:10 )  Alb: 3.0 g/dL / Pro: 5.8 g/dL / ALK PHOS: 202 U/L / ALT: 61 U/L / AST: 74 U/L / GGT: x             Urinalysis Basic - ( 13 Mar 2024 07:10 )    Color: x / Appearance: x / SG: x / pH: x  Gluc: 85 mg/dL / Ketone: x  / Bili: x / Urobili: x   Blood: x / Protein: x / Nitrite: x   Leuk Esterase: x / RBC: x / WBC x   Sq Epi: x / Non Sq Epi: x / Bacteria: x           < from: CT Head No Cont (03.10.24 @ 16:30) >    ACC: [id number] EXAM:  CT BRAIN   ORDERED BY: KO THIBODEAUX     PROCEDURE DATE:  03/10/2024          INTERPRETATION:  HISTORY: Altered mental status.    COMPARISON: None available.    TECHNIQUE: Axial noncontrast CT images from the skull base to the vertex   were obtained and submitted for interpretation. Coronal and sagittal   reformatted images were performed. Bone and soft tissue windows were   evaluated.    FINDINGS:    There is no acute intracranial mass-effect, hemorrhage, midline shift, or  abnormal extra-axial fluid collection. Gray-white differentiation is   maintained.    Encephalomalacia and volume loss in the occipital lobes bilaterally.   Patchy and confluent periventricular and deep cerebral white matter   hypoattenuation due to chronic microangiopathic ischemic changes.   Atheromatous calcifications along the carotid siphons.    There is trace hyperattenuation along the left middle frontal cortex,   likely reflecting gyriform mineralization.    Moderate centrally predominant cerebral volume loss. No hydrocephalus.   Basal cisterns are patent.    Visualized paranasal sinuses and mastoid air cells are clear. Right   maria bullosa noted. Bilateral cataract surgery. Calvarium is intact.    IMPRESSION:    Trace hyperattenuation along the left middle frontal cortex, likely   reflecting gyriform mineralization. No other suspicion of acute   intracranial bleeding.    Bilateral occipital encephalomalacia.  Central volume loss and advanced microvascular ischemic changes.    --- End of Report ---        < from: CT Abdomen and Pelvis w/ IV Cont (03.12.24 @ 10:10) >    ACC: [id number] EXAM:  CT ABDOMEN AND PELVIS IC   ORDERED BY: CAYLA PEREA     PROCEDURE DATE:  03/12/2024          INTERPRETATION:  CLINICAL INFORMATION: Escherichia coli sepsis.    COMPARISON: None.    CONTRAST/COMPLICATIONS:  IV Contrast: Omnipaque 350  90 cc administered   10 cc discarded  Oral Contrast: NONE  Complications: None reported at time of study completion    PROCEDURE:  CT of the Abdomen and Pelvis was performed.  Sagittal and coronal reformats were performed.    FINDINGS:  LOWER CHEST: Small bilateral pleural effusions, left greater than right   with associated basilar atelectasis. Cardiomegaly.    LIVER: Within normal limits.  BILE DUCTS: Normal caliber.  GALLBLADDER: Within normal limits.  SPLEEN: Within normal limits.  PANCREAS: Within normal limits.  ADRENALS: Within normal limits.  KIDNEYS/URETERS: 2 mm nonobstructing right renal calculus. Mild left   hydronephrosis with urothelial thickening especially of the renal pelvis   and bilateral perinephric edema. No ureteral dilatation.    BLADDER: Within normal limits.  REPRODUCTIVE ORGANS: The endometrium is prominent, possibly thickened at   the fundus. Correlate with pelvic ultrasound.    BOWEL: Small focus of fluid in the gastric fundus (3, 49), question   superficial erosion. No surrounding inflammatory changes. No bowel   obstruction. Appendix is normal.  PERITONEUM: No ascites.  VESSELS: Within normal limits.  RETROPERITONEUM/LYMPH NODES: No lymphadenopathy.  ABDOMINAL WALL: Within normal limits.  BONES:Degenerative changes. Right hip arthroplasty resulting in streak   artifact in the pelvis.    IMPRESSION:  Mild left hydronephrosis with urothelial thickening and bilateral   perinephric edema. Correlate with urinalysis for urinary tract infection.    Small fluid versus possible superficial erosion in the gastric fundus. No   surrounding inflammatory changes. Endoscopy would be more sensitive to   assess for peptic ulcer disease.    Endometrial thickening is questioned. Correlate with pelvic ultrasound.    --- End of Report ---            SORAIDA GIL MD; Attending Radiologist  This document has been electronically signed. Mar 12 2024  2:36PM    < end of copied text >  
Neurology     S: patient seen and examined.  no neuro changes ; family at bedside       Medications:         Medications: MEDICATIONS  (STANDING):  albuterol/ipratropium for Nebulization 3 milliLiter(s) Nebulizer every 6 hours  atorvastatin 20 milliGRAM(s) Oral at bedtime  cefuroxime   Tablet 500 milliGRAM(s) Oral every 12 hours  chlorhexidine 2% Cloths 1 Application(s) Topical daily  enoxaparin Injectable 40 milliGRAM(s) SubCutaneous every 24 hours  latanoprost 0.005% Ophthalmic Solution 1 Drop(s) Both EYES at bedtime  senna 2 Tablet(s) Oral at bedtime  tamsulosin 0.4 milliGRAM(s) Oral at bedtime  timolol 0.5% Solution 1 Drop(s) Both EYES two times a day    MEDICATIONS  (PRN):  acetaminophen     Tablet .. 650 milliGRAM(s) Oral every 6 hours PRN Temp greater or equal to 38C (100.4F), Mild Pain (1 - 3)  ondansetron Injectable 4 milliGRAM(s) IV Push every 6 hours PRN Nausea and/or Vomiting  polyethylene glycol 3350 17 Gram(s) Oral daily PRN Constipation       Vitals:  Vital Signs Last 24 Hrs  T(C): 36.6 (19 Mar 2024 08:24), Max: 36.8 (19 Mar 2024 00:37)  T(F): 97.9 (19 Mar 2024 08:24), Max: 98.2 (19 Mar 2024 00:37)  HR: 76 (19 Mar 2024 08:24) (76 - 89)  BP: 114/73 (19 Mar 2024 08:24) (102/68 - 114/73)  BP(mean): --  RR: 18 (19 Mar 2024 08:24) (18 - 18)  SpO2: 90% (19 Mar 2024 08:24) (90% - 95%)    Parameters below as of 19 Mar 2024 08:24  Patient On (Oxygen Delivery Method): room air                    General Exam:   General Appearance: Appropriately dressed and in no acute distress       Head: Normocephalic, atraumatic and no dysmorphic features  Ear, Nose, and Throat: Moist mucous membranes  CVS: S1S2+  Resp: No SOB, no wheeze or rhonchi  GI: soft NT/ND  Extremities: No edema or cyanosis  Skin: No bruises or rashes     Neurological Exam:  Mental Status: Awake, alert and oriented x 1-2.  Able to follow simple  verbal commands. Able to name and repeat. fluent speech. No obvious aphasia mild dysarthria noted.   Cranial Nerves: PERRL, EOMI, decrase visual acuity bilateraly., sensation V1-V3 intact,  no obvious facial asymmetry, equal elevation of palate, scm/trap 5/5, tongue is midline on protrusion. no obvious papilledema on fundoscopic exam. hearing is grossly intact.   Motor: LACEY at least 4/5 throughout   Sensation: Intact to light touch   throughout.    Coordination: No dysmetria on FNF    Gait: cane/walker at baseline     Data/Labs/Imaging which I personally reviewed.          LABS:no new labs today 3/19            < from: CT Head No Cont (03.10.24 @ 16:30) >    ACC: [id number] EXAM:  CT BRAIN   ORDERED BY: KO THIBODEAUX     PROCEDURE DATE:  03/10/2024          INTERPRETATION:  HISTORY: Altered mental status.    COMPARISON: None available.    TECHNIQUE: Axial noncontrast CT images from the skull base to the vertex   were obtained and submitted for interpretation. Coronal and sagittal   reformatted images were performed. Bone and soft tissue windows were   evaluated.    FINDINGS:    There is no acute intracranial mass-effect, hemorrhage, midline shift, or  abnormal extra-axial fluid collection. Gray-white differentiation is   maintained.    Encephalomalacia and volume loss in the occipital lobes bilaterally.   Patchy and confluent periventricular and deep cerebral white matter   hypoattenuation due to chronic microangiopathic ischemic changes.   Atheromatous calcifications along the carotid siphons.    There is trace hyperattenuation along the left middle frontal cortex,   likely reflecting gyriform mineralization.    Moderate centrally predominant cerebral volume loss. No hydrocephalus.   Basal cisterns are patent.    Visualized paranasal sinuses and mastoid air cells are clear. Right   maria bullosa noted. Bilateral cataract surgery. Calvarium is intact.    IMPRESSION:    Trace hyperattenuation along the left middle frontal cortex, likely   reflecting gyriform mineralization. No other suspicion of acute   intracranial bleeding.    Bilateral occipital encephalomalacia.  Central volume loss and advanced microvascular ischemic changes.    --- End of Report ---        < from: CT Abdomen and Pelvis w/ IV Cont (03.12.24 @ 10:10) >    ACC: [id number] EXAM:  CT ABDOMEN AND PELVIS IC   ORDERED BY: CAYLA PEREA     PROCEDURE DATE:  03/12/2024          INTERPRETATION:  CLINICAL INFORMATION: Escherichia coli sepsis.    COMPARISON: None.    CONTRAST/COMPLICATIONS:  IV Contrast: Omnipaque 350  90 cc administered   10 cc discarded  Oral Contrast: NONE  Complications: None reported at time of study completion    PROCEDURE:  CT of the Abdomen and Pelvis was performed.  Sagittal and coronal reformats were performed.    FINDINGS:  LOWER CHEST: Small bilateral pleural effusions, left greater than right   with associated basilar atelectasis. Cardiomegaly.    LIVER: Within normal limits.  BILE DUCTS: Normal caliber.  GALLBLADDER: Within normal limits.  SPLEEN: Within normal limits.  PANCREAS: Within normal limits.  ADRENALS: Within normal limits.  KIDNEYS/URETERS: 2 mm nonobstructing right renal calculus. Mild left   hydronephrosis with urothelial thickening especially of the renal pelvis   and bilateral perinephric edema. No ureteral dilatation.    BLADDER: Within normal limits.  REPRODUCTIVE ORGANS: The endometrium is prominent, possibly thickened at   the fundus. Correlate with pelvic ultrasound.    BOWEL: Small focus of fluid in the gastric fundus (3, 49), question   superficial erosion. No surrounding inflammatory changes. No bowel   obstruction. Appendix is normal.  PERITONEUM: No ascites.  VESSELS: Within normal limits.  RETROPERITONEUM/LYMPH NODES: No lymphadenopathy.  ABDOMINAL WALL: Within normal limits.  BONES:Degenerative changes. Right hip arthroplasty resulting in streak   artifact in the pelvis.    IMPRESSION:  Mild left hydronephrosis with urothelial thickening and bilateral   perinephric edema. Correlate with urinalysis for urinary tract infection.    Small fluid versus possible superficial erosion in the gastric fundus. No   surrounding inflammatory changes. Endoscopy would be more sensitive to   assess for peptic ulcer disease.    Endometrial thickening is questioned. Correlate with pelvic ultrasound.    --- End of Report ---            SORAIDA GIL MD; Attending Radiologist  This document has been electronically signed. Mar 12 2024  2:36PM    < end of copied text >  
Neurology     S: patient seen and examined.  no neuro changes ; family at bedside       Medications:       Medications: MEDICATIONS  (STANDING):  albuterol/ipratropium for Nebulization 3 milliLiter(s) Nebulizer every 6 hours  atorvastatin 20 milliGRAM(s) Oral at bedtime  cefuroxime   Tablet 500 milliGRAM(s) Oral every 12 hours  chlorhexidine 2% Cloths 1 Application(s) Topical daily  enoxaparin Injectable 40 milliGRAM(s) SubCutaneous every 24 hours  latanoprost 0.005% Ophthalmic Solution 1 Drop(s) Both EYES at bedtime  senna 2 Tablet(s) Oral at bedtime  tamsulosin 0.4 milliGRAM(s) Oral at bedtime  timolol 0.5% Solution 1 Drop(s) Both EYES two times a day    MEDICATIONS  (PRN):  acetaminophen     Tablet .. 650 milliGRAM(s) Oral every 6 hours PRN Temp greater or equal to 38C (100.4F), Mild Pain (1 - 3)  ondansetron Injectable 4 milliGRAM(s) IV Push every 6 hours PRN Nausea and/or Vomiting  polyethylene glycol 3350 17 Gram(s) Oral daily PRN Constipation       Vitals:  Vital Signs Last 24 Hrs  T(C): 36.9 (18 Mar 2024 08:25), Max: 37.3 (17 Mar 2024 15:43)  T(F): 98.4 (18 Mar 2024 08:25), Max: 99.1 (17 Mar 2024 15:43)  HR: 74 (18 Mar 2024 08:25) (74 - 90)  BP: 129/70 (18 Mar 2024 08:25) (111/64 - 130/64)  BP(mean): --  RR: 18 (18 Mar 2024 08:25) (18 - 18)  SpO2: 93% (18 Mar 2024 08:25) (90% - 98%)    Parameters below as of 18 Mar 2024 08:25  Patient On (Oxygen Delivery Method): nasal cannula  O2 Flow (L/min): 2                General Exam:   General Appearance: Appropriately dressed and in no acute distress       Head: Normocephalic, atraumatic and no dysmorphic features  Ear, Nose, and Throat: Moist mucous membranes  CVS: S1S2+  Resp: No SOB, no wheeze or rhonchi  GI: soft NT/ND  Extremities: No edema or cyanosis  Skin: No bruises or rashes     Neurological Exam:  Mental Status: Awake, alert and oriented x 1-2.  Able to follow simple  verbal commands. Able to name and repeat. fluent speech. No obvious aphasia mild dysarthria noted.   Cranial Nerves: PERRL, EOMI, decrase visual acuity bilateraly., sensation V1-V3 intact,  no obvious facial asymmetry, equal elevation of palate, scm/trap 5/5, tongue is midline on protrusion. no obvious papilledema on fundoscopic exam. hearing is grossly intact.   Motor: LACEY at least 4/5 throughout   Sensation: Intact to light touch   throughout.    Coordination: No dysmetria on FNF    Gait: cane/walker at baseline     Data/Labs/Imaging which I personally reviewed.          LABS:no new labs today 3/18             < from: CT Head No Cont (03.10.24 @ 16:30) >    ACC: [id number] EXAM:  CT BRAIN   ORDERED BY: KO THIBODEAUX     PROCEDURE DATE:  03/10/2024          INTERPRETATION:  HISTORY: Altered mental status.    COMPARISON: None available.    TECHNIQUE: Axial noncontrast CT images from the skull base to the vertex   were obtained and submitted for interpretation. Coronal and sagittal   reformatted images were performed. Bone and soft tissue windows were   evaluated.    FINDINGS:    There is no acute intracranial mass-effect, hemorrhage, midline shift, or  abnormal extra-axial fluid collection. Gray-white differentiation is   maintained.    Encephalomalacia and volume loss in the occipital lobes bilaterally.   Patchy and confluent periventricular and deep cerebral white matter   hypoattenuation due to chronic microangiopathic ischemic changes.   Atheromatous calcifications along the carotid siphons.    There is trace hyperattenuation along the left middle frontal cortex,   likely reflecting gyriform mineralization.    Moderate centrally predominant cerebral volume loss. No hydrocephalus.   Basal cisterns are patent.    Visualized paranasal sinuses and mastoid air cells are clear. Right   maria bullosa noted. Bilateral cataract surgery. Calvarium is intact.    IMPRESSION:    Trace hyperattenuation along the left middle frontal cortex, likely   reflecting gyriform mineralization. No other suspicion of acute   intracranial bleeding.    Bilateral occipital encephalomalacia.  Central volume loss and advanced microvascular ischemic changes.    --- End of Report ---        < from: CT Abdomen and Pelvis w/ IV Cont (03.12.24 @ 10:10) >    ACC: [id number] EXAM:  CT ABDOMEN AND PELVIS IC   ORDERED BY: CAYLA PEREA     PROCEDURE DATE:  03/12/2024          INTERPRETATION:  CLINICAL INFORMATION: Escherichia coli sepsis.    COMPARISON: None.    CONTRAST/COMPLICATIONS:  IV Contrast: Omnipaque 350  90 cc administered   10 cc discarded  Oral Contrast: NONE  Complications: None reported at time of study completion    PROCEDURE:  CT of the Abdomen and Pelvis was performed.  Sagittal and coronal reformats were performed.    FINDINGS:  LOWER CHEST: Small bilateral pleural effusions, left greater than right   with associated basilar atelectasis. Cardiomegaly.    LIVER: Within normal limits.  BILE DUCTS: Normal caliber.  GALLBLADDER: Within normal limits.  SPLEEN: Within normal limits.  PANCREAS: Within normal limits.  ADRENALS: Within normal limits.  KIDNEYS/URETERS: 2 mm nonobstructing right renal calculus. Mild left   hydronephrosis with urothelial thickening especially of the renal pelvis   and bilateral perinephric edema. No ureteral dilatation.    BLADDER: Within normal limits.  REPRODUCTIVE ORGANS: The endometrium is prominent, possibly thickened at   the fundus. Correlate with pelvic ultrasound.    BOWEL: Small focus of fluid in the gastric fundus (3, 49), question   superficial erosion. No surrounding inflammatory changes. No bowel   obstruction. Appendix is normal.  PERITONEUM: No ascites.  VESSELS: Within normal limits.  RETROPERITONEUM/LYMPH NODES: No lymphadenopathy.  ABDOMINAL WALL: Within normal limits.  BONES:Degenerative changes. Right hip arthroplasty resulting in streak   artifact in the pelvis.    IMPRESSION:  Mild left hydronephrosis with urothelial thickening and bilateral   perinephric edema. Correlate with urinalysis for urinary tract infection.    Small fluid versus possible superficial erosion in the gastric fundus. No   surrounding inflammatory changes. Endoscopy would be more sensitive to   assess for peptic ulcer disease.    Endometrial thickening is questioned. Correlate with pelvic ultrasound.    --- End of Report ---            SORAIDA GIL MD; Attending Radiologist  This document has been electronically signed. Mar 12 2024  2:36PM    < end of copied text >  
Neurology     S: patient seen and examined.  no neuro changes ; family at bedside       Medications:     Medications: MEDICATIONS  (STANDING):  albuterol/ipratropium for Nebulization 3 milliLiter(s) Nebulizer every 6 hours  atorvastatin 20 milliGRAM(s) Oral at bedtime  cefTRIAXone   IVPB 1000 milliGRAM(s) IV Intermittent every 24 hours  cefTRIAXone   IVPB      chlorhexidine 2% Cloths 1 Application(s) Topical daily  enoxaparin Injectable 40 milliGRAM(s) SubCutaneous every 24 hours  latanoprost 0.005% Ophthalmic Solution 1 Drop(s) Both EYES at bedtime  senna 2 Tablet(s) Oral at bedtime  tamsulosin 0.4 milliGRAM(s) Oral at bedtime  timolol 0.5% Solution 1 Drop(s) Both EYES two times a day    MEDICATIONS  (PRN):  acetaminophen     Tablet .. 650 milliGRAM(s) Oral every 6 hours PRN Temp greater or equal to 38C (100.4F), Mild Pain (1 - 3)  ondansetron Injectable 4 milliGRAM(s) IV Push every 6 hours PRN Nausea and/or Vomiting  polyethylene glycol 3350 17 Gram(s) Oral daily PRN Constipation       Vitals:  Vital Signs Last 24 Hrs  T(C): 36.8 (14 Mar 2024 09:03), Max: 36.9 (14 Mar 2024 00:28)  T(F): 98.3 (14 Mar 2024 09:03), Max: 98.4 (14 Mar 2024 00:28)  HR: 77 (14 Mar 2024 09:03) (77 - 91)  BP: 152/80 (14 Mar 2024 09:03) (139/69 - 152/80)  BP(mean): --  RR: 18 (14 Mar 2024 09:03) (18 - 18)  SpO2: 95% (14 Mar 2024 09:03) (93% - 95%)    Parameters below as of 14 Mar 2024 09:03  Patient On (Oxygen Delivery Method): nasal cannula  O2 Flow (L/min): 2           General Exam:   General Appearance: Appropriately dressed and in no acute distress       Head: Normocephalic, atraumatic and no dysmorphic features  Ear, Nose, and Throat: Moist mucous membranes  CVS: S1S2+  Resp: No SOB, no wheeze or rhonchi  GI: soft NT/ND  Extremities: No edema or cyanosis  Skin: No bruises or rashes     Neurological Exam:  Mental Status: Awake, alert and oriented x 1-2.  Able to follow simple  verbal commands. Able to name and repeat. fluent speech. No obvious aphasia mild dysarthria noted.   Cranial Nerves: PERRL, EOMI, decrase visual acuity bilateraly., sensation V1-V3 intact,  no obvious facial asymmetry, equal elevation of palate, scm/trap 5/5, tongue is midline on protrusion. no obvious papilledema on fundoscopic exam. hearing is grossly intact.   Motor: LACEY at least 4/5 throughout   Sensation: Intact to light touch   throughout.    Coordination: No dysmetria on FNF    Gait: cane/walker at baseline     Data/Labs/Imaging which I personally reviewed.        LABS:                          9.0    14.19 )-----------( 150      ( 14 Mar 2024 06:59 )             28.6     03-14    139  |  105  |  21  ----------------------------<  90  3.8   |  25  |  0.86    Ca    9.0      14 Mar 2024 06:58    TPro  5.6<L>  /  Alb  3.1<L>  /  TBili  0.4  /  DBili  x   /  AST  69<H>  /  ALT  106<H>  /  AlkPhos  231<H>  03-14    LIVER FUNCTIONS - ( 14 Mar 2024 06:58 )  Alb: 3.1 g/dL / Pro: 5.6 g/dL / ALK PHOS: 231 U/L / ALT: 106 U/L / AST: 69 U/L / GGT: x             Urinalysis Basic - ( 14 Mar 2024 06:58 )    Color: x / Appearance: x / SG: x / pH: x  Gluc: 90 mg/dL / Ketone: x  / Bili: x / Urobili: x   Blood: x / Protein: x / Nitrite: x   Leuk Esterase: x / RBC: x / WBC x   Sq Epi: x / Non Sq Epi: x / Bacteria: x          < from: CT Head No Cont (03.10.24 @ 16:30) >    ACC: [id number] EXAM:  CT BRAIN   ORDERED BY: KO THIBODEAUX     PROCEDURE DATE:  03/10/2024          INTERPRETATION:  HISTORY: Altered mental status.    COMPARISON: None available.    TECHNIQUE: Axial noncontrast CT images from the skull base to the vertex   were obtained and submitted for interpretation. Coronal and sagittal   reformatted images were performed. Bone and soft tissue windows were   evaluated.    FINDINGS:    There is no acute intracranial mass-effect, hemorrhage, midline shift, or  abnormal extra-axial fluid collection. Gray-white differentiation is   maintained.    Encephalomalacia and volume loss in the occipital lobes bilaterally.   Patchy and confluent periventricular and deep cerebral white matter   hypoattenuation due to chronic microangiopathic ischemic changes.   Atheromatous calcifications along the carotid siphons.    There is trace hyperattenuation along the left middle frontal cortex,   likely reflecting gyriform mineralization.    Moderate centrally predominant cerebral volume loss. No hydrocephalus.   Basal cisterns are patent.    Visualized paranasal sinuses and mastoid air cells are clear. Right   maria bullosa noted. Bilateral cataract surgery. Calvarium is intact.    IMPRESSION:    Trace hyperattenuation along the left middle frontal cortex, likely   reflecting gyriform mineralization. No other suspicion of acute   intracranial bleeding.    Bilateral occipital encephalomalacia.  Central volume loss and advanced microvascular ischemic changes.    --- End of Report ---        < from: CT Abdomen and Pelvis w/ IV Cont (03.12.24 @ 10:10) >    ACC: [id number] EXAM:  CT ABDOMEN AND PELVIS IC   ORDERED BY: CAYLA PEREA     PROCEDURE DATE:  03/12/2024          INTERPRETATION:  CLINICAL INFORMATION: Escherichia coli sepsis.    COMPARISON: None.    CONTRAST/COMPLICATIONS:  IV Contrast: Omnipaque 350  90 cc administered   10 cc discarded  Oral Contrast: NONE  Complications: None reported at time of study completion    PROCEDURE:  CT of the Abdomen and Pelvis was performed.  Sagittal and coronal reformats were performed.    FINDINGS:  LOWER CHEST: Small bilateral pleural effusions, left greater than right   with associated basilar atelectasis. Cardiomegaly.    LIVER: Within normal limits.  BILE DUCTS: Normal caliber.  GALLBLADDER: Within normal limits.  SPLEEN: Within normal limits.  PANCREAS: Within normal limits.  ADRENALS: Within normal limits.  KIDNEYS/URETERS: 2 mm nonobstructing right renal calculus. Mild left   hydronephrosis with urothelial thickening especially of the renal pelvis   and bilateral perinephric edema. No ureteral dilatation.    BLADDER: Within normal limits.  REPRODUCTIVE ORGANS: The endometrium is prominent, possibly thickened at   the fundus. Correlate with pelvic ultrasound.    BOWEL: Small focus of fluid in the gastric fundus (3, 49), question   superficial erosion. No surrounding inflammatory changes. No bowel   obstruction. Appendix is normal.  PERITONEUM: No ascites.  VESSELS: Within normal limits.  RETROPERITONEUM/LYMPH NODES: No lymphadenopathy.  ABDOMINAL WALL: Within normal limits.  BONES:Degenerative changes. Right hip arthroplasty resulting in streak   artifact in the pelvis.    IMPRESSION:  Mild left hydronephrosis with urothelial thickening and bilateral   perinephric edema. Correlate with urinalysis for urinary tract infection.    Small fluid versus possible superficial erosion in the gastric fundus. No   surrounding inflammatory changes. Endoscopy would be more sensitive to   assess for peptic ulcer disease.    Endometrial thickening is questioned. Correlate with pelvic ultrasound.    --- End of Report ---            SORAIDA GIL MD; Attending Radiologist  This document has been electronically signed. Mar 12 2024  2:36PM    < end of copied text >  
infectious diseases progress note:    Patient is a 83y old  Female who presents with a chief complaint of Urosepsis (13 Mar 2024 16:39)        Sepsis               Allergies    No Known Allergies    Intolerances        ANTIBIOTICS/RELEVANT:  antimicrobials  cefTRIAXone   IVPB 1000 milliGRAM(s) IV Intermittent every 24 hours  cefTRIAXone   IVPB        immunologic:    OTHER:  acetaminophen     Tablet .. 650 milliGRAM(s) Oral every 6 hours PRN  albuterol/ipratropium for Nebulization 3 milliLiter(s) Nebulizer every 6 hours  atorvastatin 20 milliGRAM(s) Oral at bedtime  chlorhexidine 2% Cloths 1 Application(s) Topical daily  enoxaparin Injectable 40 milliGRAM(s) SubCutaneous every 24 hours  latanoprost 0.005% Ophthalmic Solution 1 Drop(s) Both EYES at bedtime  ondansetron Injectable 4 milliGRAM(s) IV Push every 6 hours PRN  polyethylene glycol 3350 17 Gram(s) Oral daily PRN  senna 2 Tablet(s) Oral at bedtime  tamsulosin 0.4 milliGRAM(s) Oral at bedtime  timolol 0.5% Solution 1 Drop(s) Both EYES two times a day      Objective:  Vital Signs Last 24 Hrs  T(C): 36.7 (14 Mar 2024 05:10), Max: 36.9 (14 Mar 2024 00:28)  T(F): 98.1 (14 Mar 2024 05:10), Max: 98.4 (14 Mar 2024 00:28)  HR: 89 (14 Mar 2024 05:10) (80 - 91)  BP: 139/69 (14 Mar 2024 05:10) (139/69 - 146/73)  BP(mean): --  RR: 18 (14 Mar 2024 05:10) (18 - 18)  SpO2: 93% (14 Mar 2024 05:10) (93% - 95%)    Parameters below as of 14 Mar 2024 05:10  Patient On (Oxygen Delivery Method): nasal cannula  O2 Flow (L/min): 2         Eyes:GRACE, EOMI  Ear/Nose/Throat: no oral lesion, no sinus tenderness on percussion	  Neck:no JVD, no lymphadenopathy, supple  Respiratory: CTA zakiya  Cardiovascular: S1S2 RRR, no murmurs  Gastrointestinal:soft, (+) BS, no HSM  Extremities:no e/e/c        LABS:                        9.0    14.19 )-----------( 150      ( 14 Mar 2024 06:59 )             28.6     03-14    139  |  105  |  21  ----------------------------<  90  3.8   |  25  |  0.86    Ca    9.0      14 Mar 2024 06:58    TPro  5.6<L>  /  Alb  3.1<L>  /  TBili  0.4  /  DBili  x   /  AST  69<H>  /  ALT  106<H>  /  AlkPhos  231<H>  03-14      Urinalysis Basic - ( 14 Mar 2024 06:58 )    Color: x / Appearance: x / SG: x / pH: x  Gluc: 90 mg/dL / Ketone: x  / Bili: x / Urobili: x   Blood: x / Protein: x / Nitrite: x   Leuk Esterase: x / RBC: x / WBC x   Sq Epi: x / Non Sq Epi: x / Bacteria: x          MICROBIOLOGY:    RECENT CULTURES:  03-12 @ 08:10 .Blood Blood-Peripheral                No growth at 24 hours    03-12 @ 08:00 .Blood Blood-Peripheral                No growth at 24 hours    03-10 @ 15:07 Clean Catch Clean Catch (Midstream)   RAFFAELE      Escherichia coli  Escherichia coli     >100,000 CFU/ml Escherichia coli    03-10 @ 14:25 .Blood Blood-Peripheral       Growth in aerobic bottle: Gram Negative Rods  Growth in anaerobic bottle: Gram Negative Rods           Growth in aerobic and anaerobic bottles: Escherichia coli  See previous culture [id number]    03-10 @ 14:20 .Blood Blood-Peripheral   PCR    Growth in aerobic bottle: Gram Negative Rods  Growth in anaerobic bottle: Gram Negative Rods    Blood Culture PCR  Escherichia coli  Blood Culture PCR     Growth in aerobic and anaerobic bottles: Escherichia coli  Direct identification is available within approximately 3-5  hours either by Blood Panel Multiplexed PCR or Direct  MALDI-TOF. Details: [email or web address]          RESPIRATORY CULTURES:              RADIOLOGY & ADDITIONAL STUDIES:        Pager [phone]  After 5 pm/weekends or if no response :[phone]
DATE OF SERVICE: 03-12-24 @ 14:17  CHIEF COMPLAINT:Patient is a 83y old  Female who presents with a chief complaint of Urosepsis (12 Mar 2024 12:58)    	        PAST MEDICAL & SURGICAL HISTORY:  HLD (hyperlipidemia)      Macular degeneration      H/O thalassemia minor      S/P cataract surgery      S/P hip replacement              REVIEW OF SYSTEMS:  weak  RESPIRATORY: No cough, wheezing, chills or hemoptysis; No Shortness of Breath  CARDIOVASCULAR: No chest pain, palpitations, passing out,   GASTROINTESTINAL: No abdominal or epigastric pain. No nausea, vomiting, or hematemesis;   GENITOURINARY: No dysuria, frequency, hematuria, or incontinence  NEUROLOGICAL: No headaches,     Medications:  MEDICATIONS  (STANDING):  albuterol/ipratropium for Nebulization 3 milliLiter(s) Nebulizer every 6 hours  atorvastatin 20 milliGRAM(s) Oral at bedtime  cefTRIAXone   IVPB      chlorhexidine 2% Cloths 1 Application(s) Topical daily  enoxaparin Injectable 40 milliGRAM(s) SubCutaneous every 24 hours  latanoprost 0.005% Ophthalmic Solution 1 Drop(s) Both EYES at bedtime  timolol 0.5% Solution 1 Drop(s) Both EYES two times a day    MEDICATIONS  (PRN):  acetaminophen     Tablet .. 650 milliGRAM(s) Oral every 6 hours PRN Temp greater or equal to 38C (100.4F), Mild Pain (1 - 3)  ondansetron Injectable 4 milliGRAM(s) IV Push every 6 hours PRN Nausea and/or Vomiting    	    PHYSICAL EXAM:  T(C): 36.7 (03-12-24 @ 11:10), Max: 36.7 (03-11-24 @ 16:23)  HR: 82 (03-12-24 @ 11:10) (82 - 90)  BP: 148/78 (03-12-24 @ 11:10) (118/72 - 148/78)  RR: 18 (03-12-24 @ 11:10) (18 - 18)  SpO2: 96% (03-12-24 @ 11:10) (94% - 96%)  Wt(kg): --  I&O's Summary    12 Mar 2024 07:01  -  12 Mar 2024 14:17  --------------------------------------------------------  IN: 100 mL / OUT: 0 mL / NET: 100 mL        Appearance: Normal	  HEENT:   Normal oral mucosa, PERRL, EOMI	    Cardiovascular: Normal S1 S2, No JVD,   Respiratory: Lungs clear to auscultation	  Psychiatry: A & O x 3,   Gastrointestinal:  Soft, Non-tender, + BS	  	  Neurologic: Non-focal  Extremities: Normal range of motion,   TELEMETRY: 	    ECG:  	  RADIOLOGY:  OTHER: 	  	  LABS:	 	    CARDIAC MARKERS:  CARDIAC MARKERS ( 10 Mar 2024 14:30 )  x     / x     / 41 U/L / x     / x                                    10.3   30.42 )-----------( 135      ( 12 Mar 2024 07:16 )             33.4     03-12    141  |  106  |  29<H>  ----------------------------<  75  3.6   |  21<L>  |  0.94    Ca    8.7      12 Mar 2024 07:16  Phos  2.5     03-12  Mg     1.9     03-12    TPro  6.0  /  Alb  3.2<L>  /  TBili  0.7  /  DBili  x   /  AST  25  /  ALT  25  /  AlkPhos  123<H>  03-12    proBNP:   Lipid Profile:   HgA1c:   TSH:     	        
Patient is a 83y old  Female who presents with a chief complaint of Urosepsis (14 Mar 2024 09:53)      DATE OF SERVICE: 03-14-24 @ 15:17    SUBJECTIVE / OVERNIGHT EVENTS: overnight events noted    ROS:  Resp: No cough no sputum production  CVS: No chest pain no palpitations no orthopnea  GI: no N/V/D  "I finally feel better"         MEDICATIONS  (STANDING):  albuterol/ipratropium for Nebulization 3 milliLiter(s) Nebulizer every 6 hours  atorvastatin 20 milliGRAM(s) Oral at bedtime  cefTRIAXone   IVPB 1000 milliGRAM(s) IV Intermittent every 24 hours  cefTRIAXone   IVPB      chlorhexidine 2% Cloths 1 Application(s) Topical daily  enoxaparin Injectable 40 milliGRAM(s) SubCutaneous every 24 hours  latanoprost 0.005% Ophthalmic Solution 1 Drop(s) Both EYES at bedtime  senna 2 Tablet(s) Oral at bedtime  tamsulosin 0.4 milliGRAM(s) Oral at bedtime  timolol 0.5% Solution 1 Drop(s) Both EYES two times a day    MEDICATIONS  (PRN):  acetaminophen     Tablet .. 650 milliGRAM(s) Oral every 6 hours PRN Temp greater or equal to 38C (100.4F), Mild Pain (1 - 3)  ondansetron Injectable 4 milliGRAM(s) IV Push every 6 hours PRN Nausea and/or Vomiting  polyethylene glycol 3350 17 Gram(s) Oral daily PRN Constipation        CAPILLARY BLOOD GLUCOSE        I&O's Summary    13 Mar 2024 07:01  -  14 Mar 2024 07:00  --------------------------------------------------------  IN: 560 mL / OUT: 600 mL / NET: -40 mL    14 Mar 2024 07:01  -  14 Mar 2024 15:17  --------------------------------------------------------  IN: 240 mL / OUT: 200 mL / NET: 40 mL        Vital Signs Last 24 Hrs  T(C): 36.8 (14 Mar 2024 09:03), Max: 36.9 (14 Mar 2024 00:28)  T(F): 98.3 (14 Mar 2024 09:03), Max: 98.4 (14 Mar 2024 00:28)  HR: 77 (14 Mar 2024 09:03) (77 - 91)  BP: 152/80 (14 Mar 2024 09:03) (139/69 - 152/80)  BP(mean): --  RR: 18 (14 Mar 2024 09:03) (18 - 18)  SpO2: 95% (14 Mar 2024 09:03) (93% - 95%)    PHYSICAL EXAM:  EYES: EOMI, PERRL  NECK: Supple, No JVD  CHEST/LUNG: improved basal crackles   HEART: S1 S2; no murmurs   ABDOMEN: Soft, Nontender  EXTREMITIES:  ,  no edema  NEUROLOGY: AO x 3 non-focal    LABS:                        9.0    14.19 )-----------( 150      ( 14 Mar 2024 06:59 )             28.6     03-14    139  |  105  |  21  ----------------------------<  90  3.8   |  25  |  0.86    Ca    9.0      14 Mar 2024 06:58    TPro  5.6<L>  /  Alb  3.1<L>  /  TBili  0.4  /  DBili  x   /  AST  69<H>  /  ALT  106<H>  /  AlkPhos  231<H>  03-14          Urinalysis Basic - ( 14 Mar 2024 06:58 )    Color: x / Appearance: x / SG: x / pH: x  Gluc: 90 mg/dL / Ketone: x  / Bili: x / Urobili: x   Blood: x / Protein: x / Nitrite: x   Leuk Esterase: x / RBC: x / WBC x   Sq Epi: x / Non Sq Epi: x / Bacteria: x          All consultant(s) notes reviewed and care discussed with other providers        Contact Number, Dr Apple [phone]
Patient is a 83y old  Female who presents with a chief complaint of Urosepsis (18 Mar 2024 10:54)      DATE OF SERVICE: 03-18-24 @ 15:56    SUBJECTIVE / OVERNIGHT EVENTS: overnight events noted    ROS:  Resp: No cough no sputum production  CVS: No chest pain no palpitations no orthopnea  GI: no N/V/D  : no dysuria, no hematuria  daughter at bedside  reports vast improvement        MEDICATIONS  (STANDING):  albuterol/ipratropium for Nebulization 3 milliLiter(s) Nebulizer every 6 hours  atorvastatin 20 milliGRAM(s) Oral at bedtime  cefuroxime   Tablet 500 milliGRAM(s) Oral every 12 hours  chlorhexidine 2% Cloths 1 Application(s) Topical daily  enoxaparin Injectable 40 milliGRAM(s) SubCutaneous every 24 hours  furosemide   Injectable 20 milliGRAM(s) IV Push daily  latanoprost 0.005% Ophthalmic Solution 1 Drop(s) Both EYES at bedtime  senna 2 Tablet(s) Oral at bedtime  tamsulosin 0.4 milliGRAM(s) Oral at bedtime  timolol 0.5% Solution 1 Drop(s) Both EYES two times a day    MEDICATIONS  (PRN):  acetaminophen     Tablet .. 650 milliGRAM(s) Oral every 6 hours PRN Temp greater or equal to 38C (100.4F), Mild Pain (1 - 3)  ondansetron Injectable 4 milliGRAM(s) IV Push every 6 hours PRN Nausea and/or Vomiting  polyethylene glycol 3350 17 Gram(s) Oral daily PRN Constipation        CAPILLARY BLOOD GLUCOSE        I&O's Summary    17 Mar 2024 07:01  -  18 Mar 2024 07:00  --------------------------------------------------------  IN: 50 mL / OUT: 1950 mL / NET: -1900 mL        Vital Signs Last 24 Hrs  T(C): 36.9 (18 Mar 2024 08:25), Max: 37.1 (18 Mar 2024 01:01)  T(F): 98.4 (18 Mar 2024 08:25), Max: 98.8 (18 Mar 2024 01:01)  HR: 74 (18 Mar 2024 08:25) (74 - 90)  BP: 129/70 (18 Mar 2024 08:25) (111/64 - 129/70)  BP(mean): --  RR: 18 (18 Mar 2024 08:25) (18 - 18)  SpO2: 93% (18 Mar 2024 08:25) (90% - 93%)    PHYSICAL EXAM:  CHEST/LUNG: clear lungs today  HEART: S1 S2; no murmurs   ABDOMEN: Soft, Nontender  EXTREMITIES:  no edema  NEUROLOGY: AO x 3 non-focal    LABS:                        9.3    11.75 )-----------( 220      ( 17 Mar 2024 07:34 )             29.5     03-18    138  |  100  |  21  ----------------------------<  108<H>  3.7   |  26  |  0.85    Ca    9.1      18 Mar 2024 07:18    TPro  6.2  /  Alb  3.1<L>  /  TBili  0.5  /  DBili  x   /  AST  55<H>  /  ALT  104<H>  /  AlkPhos  141<H>  03-17          Urinalysis Basic - ( 18 Mar 2024 07:18 )    Color: x / Appearance: x / SG: x / pH: x  Gluc: 108 mg/dL / Ketone: x  / Bili: x / Urobili: x   Blood: x / Protein: x / Nitrite: x   Leuk Esterase: x / RBC: x / WBC x   Sq Epi: x / Non Sq Epi: x / Bacteria: x          All consultant(s) notes reviewed and care discussed with other providers        Contact Number, Dr Apple [phone]
Follow-up Pulm Progress Note    No new respiratory events overnight.  Denies SOB/CP.   Sats 92-98% on 2LNC (sats increase with deep breathing)     Medications:  MEDICATIONS  (STANDING):  albuterol/ipratropium for Nebulization 3 milliLiter(s) Nebulizer every 6 hours  atorvastatin 20 milliGRAM(s) Oral at bedtime  cefTRIAXone   IVPB 1000 milliGRAM(s) IV Intermittent every 24 hours  cefTRIAXone   IVPB      chlorhexidine 2% Cloths 1 Application(s) Topical daily  enoxaparin Injectable 40 milliGRAM(s) SubCutaneous every 24 hours  latanoprost 0.005% Ophthalmic Solution 1 Drop(s) Both EYES at bedtime  senna 2 Tablet(s) Oral at bedtime  tamsulosin 0.4 milliGRAM(s) Oral at bedtime  timolol 0.5% Solution 1 Drop(s) Both EYES two times a day    MEDICATIONS  (PRN):  acetaminophen     Tablet .. 650 milliGRAM(s) Oral every 6 hours PRN Temp greater or equal to 38C (100.4F), Mild Pain (1 - 3)  ondansetron Injectable 4 milliGRAM(s) IV Push every 6 hours PRN Nausea and/or Vomiting  polyethylene glycol 3350 17 Gram(s) Oral daily PRN Constipation          Vital Signs Last 24 Hrs  T(C): 37 (15 Mar 2024 04:40), Max: 37 (15 Mar 2024 04:40)  T(F): 98.6 (15 Mar 2024 04:40), Max: 98.6 (15 Mar 2024 04:40)  HR: 97 (15 Mar 2024 04:40) (97 - 97)  BP: 168/65 (15 Mar 2024 04:40) (168/65 - 168/65)  BP(mean): --  RR: 18 (15 Mar 2024 04:40) (18 - 18)  SpO2: 97% (15 Mar 2024 04:40) (97% - 97%)    Parameters below as of 15 Mar 2024 04:40  Patient On (Oxygen Delivery Method): nasal cannula  O2 Flow (L/min): 2            03-14 @ 07:01  -  03-15 @ 07:00  --------------------------------------------------------  IN: 240 mL / OUT: 902 mL / NET: -662 mL          LABS:                        9.0    10.90 )-----------( 162      ( 15 Mar 2024 06:37 )             27.9     03-15    137  |  101  |  17  ----------------------------<  114<H>  3.5   |  25  |  0.79    Ca    8.8      15 Mar 2024 06:36    TPro  5.8<L>  /  Alb  3.0<L>  /  TBili  0.4  /  DBili  x   /  AST  48<H>  /  ALT  90<H>  /  AlkPhos  173<H>  03-15          CAPILLARY BLOOD GLUCOSE          Urinalysis Basic - ( 15 Mar 2024 06:36 )    Color: x / Appearance: x / SG: x / pH: x  Gluc: 114 mg/dL / Ketone: x  / Bili: x / Urobili: x   Blood: x / Protein: x / Nitrite: x   Leuk Esterase: x / RBC: x / WBC x   Sq Epi: x / Non Sq Epi: x / Bacteria: x                      CULTURES:     Culture - Blood (collected 03-12-24 @ 08:10)  Source: .Blood Blood-Peripheral  Preliminary Report (03-14-24 @ 17:02):    No growth at 48 Hours    Culture - Blood (collected 03-12-24 @ 08:00)  Source: .Blood Blood-Peripheral  Preliminary Report (03-14-24 @ 17:02):    No growth at 48 Hours    Culture - Blood (collected 03-10-24 @ 14:25)  Source: .Blood Blood-Peripheral  Gram Stain (03-11-24 @ 04:31):    Growth in aerobic bottle: Gram Negative Rods    Growth in anaerobic bottle: Gram Negative Rods  Final Report (03-12-24 @ 11:44):    Growth in aerobic and anaerobic bottles: Escherichia coli    See previous culture [id number]    Culture - Blood (collected 03-10-24 @ 14:20)  Source: .Blood Blood-Peripheral  Gram Stain (03-11-24 @ 04:30):    Growth in aerobic bottle: Gram Negative Rods    Growth in anaerobic bottle: Gram Negative Rods  Final Report (03-12-24 @ 11:43):    Growth in aerobic and anaerobic bottles: Escherichia coli    Direct identification is available within approximately 3-5    hours either by Blood Panel Multiplexed PCR or Direct    MALDI-TOF. Details: [email or web address]  Organism: Blood Culture PCR  Escherichia coli (03-12-24 @ 11:43)  Organism: Escherichia coli (03-12-24 @ 11:43)      Method Type: RAFFAELE      -  Ampicillin: S <=8 These ampicillin results predict results for amoxicillin      -  Ampicillin/Sulbactam: S <=4/2      -  Aztreonam: S <=4      -  Cefazolin: S <=2      -  Cefepime: S <=2      -  Cefoxitin: S <=8      -  Ceftriaxone: S <=1      -  Ciprofloxacin: S <=0.25      -  Ertapenem: S <=0.5      -  Gentamicin: S <=2      -  Imipenem: S <=1      -  Levofloxacin: S <=0.5      -  Meropenem: S <=1      -  Piperacillin/Tazobactam: S <=8      -  Tobramycin: S <=2      -  Trimethoprim/Sulfamethoxazole: S <=0.5/9.5  Organism: Blood Culture PCR (03-12-24 @ 11:43)      Method Type: PCR      -  Escherichia coli: Detec        Culture - Urine (collected 03-10-24 @ 15:07)  Source: Clean Catch Clean Catch (Midstream)  Final Report (03-13-24 @ 09:25):    >100,000 CFU/ml Escherichia coli  Organism: Escherichia coli (03-13-24 @ 09:25)  Organism: Escherichia coli (03-13-24 @ 09:25)      Method Type: RAFFAELE      -  Amoxicillin/Clavulanic Acid: S <=8/4      -  Ampicillin: S <=8 These ampicillin results predict results for amoxicillin      -  Ampicillin/Sulbactam: S <=4/2      -  Aztreonam: S <=4      -  Cefazolin: S <=2 For uncomplicated UTI with K. pneumoniae, E. coli, or P. mirablis: RAFFAELE <=16 is sensitive and RAFFAELE >=32 is resistant. This also predicts results for oral agents cefaclor, cefdinir, cefpodoxime, cefprozil, cefuroxime axetil, cephalexin and locarbef for uncomplicated UTI. Note that some isolates may be susceptible to these agents while testing resistant to cefazolin.      -  Cefepime: S <=2      -  Cefoxitin: S <=8      -  Ceftriaxone: S <=1      -  Cefuroxime: S <=4      -  Ciprofloxacin: S <=0.25      -  Ertapenem: S <=0.5      -  Gentamicin: S <=2      -  Imipenem: S <=1      -  Levofloxacin: S <=0.5      -  Meropenem: S <=1      -  Nitrofurantoin: S <=32 Should not be used to treat pyelonephritis      -  Piperacillin/Tazobactam: S <=8      -  Tobramycin: S <=2      -  Trimethoprim/Sulfamethoxazole: S <=0.5/9.5                  Physical Examination:  PULM: minimal bibasilar crackles   CVS: S1, S2 heard    RADIOLOGY REVIEWED    CT chest:   < from: CT Chest No Cont (03.11.24 @ 11:27) >  FINDINGS:    LUNGS AND AIRWAYS: Compressed bilateral lower lobe subsegmental bronchi.    Mosaic attenuation pattern of the lungs likely related to expiratory   phase imaging. Bilateral lower lobe dense opacities, left greater than   right. Bilateral scattered calcified granulomas..  PLEURA: Trace left pleural effusion.  MEDIASTINUM AND KELLY: No lymphadenopathy.  VESSELS: Aortic and coronary artery atherosclerotic calcifications.  HEART: Cardiomegaly. No pericardial effusion. Aortic valve calcifications.  CHEST WALL AND LOWER NECK: Within normal limits.  VISUALIZED UPPER ABDOMEN: Small hiatal hernia.Nonspecific mild partially   included left perinephric fat stranding.  BONES: Degenerative changes.    IMPRESSION:  Bilateral lower lobe opacities, which may represent atelectasis and/or   pneumonia.    Traceleft pleural effusion.    < end of copied text >  
  Infectious Diseases Follow Up:    Patient is a 83y old  Female who presents with a chief complaint of Urosepsis (14 Mar 2024 15:16)      Interval History/ROS:  No acute events, afebrile ON.  +fatigue, denies F/C/abdominal pain/flank pain     Allergies  No Known Allergies        ANTIMICROBIALS:  cefTRIAXone   IVPB 1000 every 24 hours  cefTRIAXone   IVPB        Current Abx:     Previous Abx     OTHER MEDS:  MEDICATIONS  (STANDING):  acetaminophen     Tablet .. 650 every 6 hours PRN  albuterol/ipratropium for Nebulization 3 every 6 hours  atorvastatin 20 at bedtime  enoxaparin Injectable 40 every 24 hours  ondansetron Injectable 4 every 6 hours PRN  polyethylene glycol 3350 17 daily PRN  senna 2 at bedtime  tamsulosin 0.4 at bedtime      Vital Signs Last 24 Hrs  T(C): 37 (15 Mar 2024 04:40), Max: 37 (15 Mar 2024 04:40)  T(F): 98.6 (15 Mar 2024 04:40), Max: 98.6 (15 Mar 2024 04:40)  HR: 97 (15 Mar 2024 04:40) (97 - 97)  BP: 168/65 (15 Mar 2024 04:40) (168/65 - 168/65)  BP(mean): --  RR: 18 (15 Mar 2024 04:40) (18 - 18)  SpO2: 97% (15 Mar 2024 04:40) (97% - 97%)    Parameters below as of 15 Mar 2024 04:40  Patient On (Oxygen Delivery Method): nasal cannula  O2 Flow (L/min): 2      PHYSICAL EXAM:  GENERAL: NAD, well-developed  HEAD:  Atraumatic, Normocephalic  EYES: EOMI, PERRLA, conjunctiva and sclera clear  NECK: Supple, No JVD  CHEST/LUNG: On NC, Clear to auscultation bilaterally; No wheeze  HEART: Regular rate and rhythm; No murmurs, rubs, or gallops  ABDOMEN: Soft, Nontender, Nondistended; Bowel sounds present  EXTREMITIES:  2+ Peripheral Pulses, No clubbing, cyanosis, or edema  PSYCH: AAOx3                          9.0    10.90 )-----------( 162      ( 15 Mar 2024 06:37 )             27.9       03-15    137  |  101  |  17  ----------------------------<  114<H>  3.5   |  25  |  0.79    Ca    8.8      15 Mar 2024 06:36    TPro  5.8<L>  /  Alb  3.0<L>  /  TBili  0.4  /  DBili  x   /  AST  48<H>  /  ALT  90<H>  /  AlkPhos  173<H>  03-15      Urinalysis Basic - ( 15 Mar 2024 06:36 )    Color: x / Appearance: x / SG: x / pH: x  Gluc: 114 mg/dL / Ketone: x  / Bili: x / Urobili: x   Blood: x / Protein: x / Nitrite: x   Leuk Esterase: x / RBC: x / WBC x   Sq Epi: x / Non Sq Epi: x / Bacteria: x        MICROBIOLOGY:  v  .Blood Blood-Peripheral  03-12-24   No growth at 48 Hours  --  --      .Blood Blood-Peripheral  03-12-24   No growth at 48 Hours  --  --      Clean Catch Clean Catch (Midstream)  03-10-24   >100,000 CFU/ml Escherichia coli  --  Escherichia coli      .Blood Blood-Peripheral  03-10-24   Growth in aerobic and anaerobic bottles: Escherichia coli  See previous culture [id number]  --    Growth in aerobic bottle: Gram Negative Rods  Growth in anaerobic bottle: Gram Negative Rods      .Blood Blood-Peripheral  03-10-24   Growth in aerobic and anaerobic bottles: Escherichia coli  Direct identification is available within approximately 3-5  hours either by Blood Panel Multiplexed PCR or Direct  MALDI-TOF. Details: [email or web address]  --  Blood Culture PCR  Escherichia coli                RADIOLOGY:    
Follow-up Pulm Progress Note    No new respiratory events overnight.  Denies SOB/CP.   Sats 98% on 2LNC  o2 lowered to 1LNC     Medications:  MEDICATIONS  (STANDING):  albuterol/ipratropium for Nebulization 3 milliLiter(s) Nebulizer every 6 hours  atorvastatin 20 milliGRAM(s) Oral at bedtime  cefTRIAXone   IVPB 1000 milliGRAM(s) IV Intermittent every 24 hours  cefTRIAXone   IVPB      chlorhexidine 2% Cloths 1 Application(s) Topical daily  enoxaparin Injectable 40 milliGRAM(s) SubCutaneous every 24 hours  latanoprost 0.005% Ophthalmic Solution 1 Drop(s) Both EYES at bedtime  senna 2 Tablet(s) Oral at bedtime  tamsulosin 0.4 milliGRAM(s) Oral at bedtime  timolol 0.5% Solution 1 Drop(s) Both EYES two times a day    MEDICATIONS  (PRN):  acetaminophen     Tablet .. 650 milliGRAM(s) Oral every 6 hours PRN Temp greater or equal to 38C (100.4F), Mild Pain (1 - 3)  ondansetron Injectable 4 milliGRAM(s) IV Push every 6 hours PRN Nausea and/or Vomiting  polyethylene glycol 3350 17 Gram(s) Oral daily PRN Constipation          Vital Signs Last 24 Hrs  T(C): 36.8 (14 Mar 2024 09:03), Max: 36.9 (14 Mar 2024 00:28)  T(F): 98.3 (14 Mar 2024 09:03), Max: 98.4 (14 Mar 2024 00:28)  HR: 77 (14 Mar 2024 09:03) (77 - 91)  BP: 152/80 (14 Mar 2024 09:03) (139/69 - 152/80)  BP(mean): --  RR: 18 (14 Mar 2024 09:03) (18 - 18)  SpO2: 95% (14 Mar 2024 09:03) (93% - 95%)    Parameters below as of 14 Mar 2024 09:03  Patient On (Oxygen Delivery Method): nasal cannula  O2 Flow (L/min): 2            03-13 @ 07:01  -  03-14 @ 07:00  --------------------------------------------------------  IN: 560 mL / OUT: 600 mL / NET: -40 mL          LABS:                        9.0    14.19 )-----------( 150      ( 14 Mar 2024 06:59 )             28.6     03-14    139  |  105  |  21  ----------------------------<  90  3.8   |  25  |  0.86    Ca    9.0      14 Mar 2024 06:58    TPro  5.6<L>  /  Alb  3.1<L>  /  TBili  0.4  /  DBili  x   /  AST  69<H>  /  ALT  106<H>  /  AlkPhos  231<H>  03-14          CAPILLARY BLOOD GLUCOSE          Urinalysis Basic - ( 14 Mar 2024 06:58 )    Color: x / Appearance: x / SG: x / pH: x  Gluc: 90 mg/dL / Ketone: x  / Bili: x / Urobili: x   Blood: x / Protein: x / Nitrite: x   Leuk Esterase: x / RBC: x / WBC x   Sq Epi: x / Non Sq Epi: x / Bacteria: x                CULTURES:    Culture - Blood (collected 03-12-24 @ 08:10)  Source: .Blood Blood-Peripheral  Preliminary Report (03-13-24 @ 17:02):    No growth at 24 hours    Culture - Blood (collected 03-12-24 @ 08:00)  Source: .Blood Blood-Peripheral  Preliminary Report (03-13-24 @ 17:02):    No growth at 24 hours    Culture - Blood (collected 03-10-24 @ 14:25)  Source: .Blood Blood-Peripheral  Gram Stain (03-11-24 @ 04:31):    Growth in aerobic bottle: Gram Negative Rods    Growth in anaerobic bottle: Gram Negative Rods  Final Report (03-12-24 @ 11:44):    Growth in aerobic and anaerobic bottles: Escherichia coli    See previous culture [id number]    Culture - Blood (collected 03-10-24 @ 14:20)  Source: .Blood Blood-Peripheral  Gram Stain (03-11-24 @ 04:30):    Growth in aerobic bottle: Gram Negative Rods    Growth in anaerobic bottle: Gram Negative Rods  Final Report (03-12-24 @ 11:43):    Growth in aerobic and anaerobic bottles: Escherichia coli    Direct identification is available within approximately 3-5    hours either by Blood Panel Multiplexed PCR or Direct    MALDI-TOF. Details: [email or web address]  Organism: Blood Culture PCR  Escherichia coli (03-12-24 @ 11:43)  Organism: Escherichia coli (03-12-24 @ 11:43)      Method Type: RAFFAELE      -  Ampicillin: S <=8 These ampicillin results predict results for amoxicillin      -  Ampicillin/Sulbactam: S <=4/2      -  Aztreonam: S <=4      -  Cefazolin: S <=2      -  Cefepime: S <=2      -  Cefoxitin: S <=8      -  Ceftriaxone: S <=1      -  Ciprofloxacin: S <=0.25      -  Ertapenem: S <=0.5      -  Gentamicin: S <=2      -  Imipenem: S <=1      -  Levofloxacin: S <=0.5      -  Meropenem: S <=1      -  Piperacillin/Tazobactam: S <=8      -  Tobramycin: S <=2      -  Trimethoprim/Sulfamethoxazole: S <=0.5/9.5  Organism: Blood Culture PCR (03-12-24 @ 11:43)      Method Type: PCR      -  Escherichia coli: Detec        Culture - Urine (collected 03-10-24 @ 15:07)  Source: Clean Catch Clean Catch (Midstream)  Final Report (03-13-24 @ 09:25):    >100,000 CFU/ml Escherichia coli  Organism: Escherichia coli (03-13-24 @ 09:25)  Organism: Escherichia coli (03-13-24 @ 09:25)      Method Type: RAFFAELE      -  Amoxicillin/Clavulanic Acid: S <=8/4      -  Ampicillin: S <=8 These ampicillin results predict results for amoxicillin      -  Ampicillin/Sulbactam: S <=4/2      -  Aztreonam: S <=4      -  Cefazolin: S <=2 For uncomplicated UTI with K. pneumoniae, E. coli, or P. mirablis: RAFFAELE <=16 is sensitive and RAFFAELE >=32 is resistant. This also predicts results for oral agents cefaclor, cefdinir, cefpodoxime, cefprozil, cefuroxime axetil, cephalexin and locarbef for uncomplicated UTI. Note that some isolates may be susceptible to these agents while testing resistant to cefazolin.      -  Cefepime: S <=2      -  Cefoxitin: S <=8      -  Ceftriaxone: S <=1      -  Cefuroxime: S <=4      -  Ciprofloxacin: S <=0.25      -  Ertapenem: S <=0.5      -  Gentamicin: S <=2      -  Imipenem: S <=1      -  Levofloxacin: S <=0.5      -  Meropenem: S <=1      -  Nitrofurantoin: S <=32 Should not be used to treat pyelonephritis      -  Piperacillin/Tazobactam: S <=8      -  Tobramycin: S <=2      -  Trimethoprim/Sulfamethoxazole: S <=0.5/9.5                  Physical Examination:  PULM: minimal bibasilar crackles   CVS: S1, S2 heard    RADIOLOGY REVIEWED    CT chest:   < from: CT Chest No Cont (03.11.24 @ 11:27) >  FINDINGS:    LUNGS AND AIRWAYS: Compressed bilateral lower lobe subsegmental bronchi.    Mosaic attenuation pattern of the lungs likely related to expiratory   phase imaging. Bilateral lower lobe dense opacities, left greater than   right. Bilateral scattered calcified granulomas..  PLEURA: Trace left pleural effusion.  MEDIASTINUM AND KELLY: No lymphadenopathy.  VESSELS: Aortic and coronary artery atherosclerotic calcifications.  HEART: Cardiomegaly. No pericardial effusion. Aortic valve calcifications.  CHEST WALL AND LOWER NECK: Within normal limits.  VISUALIZED UPPER ABDOMEN: Small hiatal hernia.Nonspecific mild partially   included left perinephric fat stranding.  BONES: Degenerative changes.    IMPRESSION:  Bilateral lower lobe opacities, which may represent atelectasis and/or   pneumonia.    Traceleft pleural effusion.    < end of copied text >  
Patient is a 83y old  Female who presents with a chief complaint of Urosepsis (15 Mar 2024 13:57)      DATE OF SERVICE: 03-16-24 @ 16:07    SUBJECTIVE / OVERNIGHT EVENTS: overnight events noted    ROS:  Resp: No cough no sputum production  CVS: No chest pain no palpitations no orthopnea  GI: no N/V/D  "I feel better"         MEDICATIONS  (STANDING):  albuterol/ipratropium for Nebulization 3 milliLiter(s) Nebulizer every 6 hours  atorvastatin 20 milliGRAM(s) Oral at bedtime  cefTRIAXone   IVPB 1000 milliGRAM(s) IV Intermittent every 24 hours  cefTRIAXone   IVPB      chlorhexidine 2% Cloths 1 Application(s) Topical daily  enoxaparin Injectable 40 milliGRAM(s) SubCutaneous every 24 hours  latanoprost 0.005% Ophthalmic Solution 1 Drop(s) Both EYES at bedtime  senna 2 Tablet(s) Oral at bedtime  tamsulosin 0.4 milliGRAM(s) Oral at bedtime  timolol 0.5% Solution 1 Drop(s) Both EYES two times a day    MEDICATIONS  (PRN):  acetaminophen     Tablet .. 650 milliGRAM(s) Oral every 6 hours PRN Temp greater or equal to 38C (100.4F), Mild Pain (1 - 3)  ondansetron Injectable 4 milliGRAM(s) IV Push every 6 hours PRN Nausea and/or Vomiting  polyethylene glycol 3350 17 Gram(s) Oral daily PRN Constipation        CAPILLARY BLOOD GLUCOSE        I&O's Summary    15 Mar 2024 07:01  -  16 Mar 2024 07:00  --------------------------------------------------------  IN: 440 mL / OUT: 800 mL / NET: -360 mL        Vital Signs Last 24 Hrs  T(C): 36.6 (16 Mar 2024 14:25), Max: 36.9 (16 Mar 2024 04:06)  T(F): 97.8 (16 Mar 2024 14:25), Max: 98.4 (16 Mar 2024 04:06)  HR: 90 (16 Mar 2024 14:25) (83 - 95)  BP: 124/62 (16 Mar 2024 14:25) (124/62 - 148/82)  BP(mean): --  RR: 18 (16 Mar 2024 14:25) (17 - 18)  SpO2: 99% (16 Mar 2024 14:25) (93% - 99%)    PHYSICAL EXAM:  CHEST/LUNG: clear    HEART: S1 S2; no murmurs   ABDOMEN: Soft, Nontender  EXTREMITIES:  no edema  NEUROLOGY: AO x 3 non-focal    LABS:                        8.9    11.80 )-----------( 249      ( 16 Mar 2024 06:51 )             27.8     03-16    139  |  103  |  16  ----------------------------<  102<H>  3.6   |  24  |  0.83    Ca    8.9      16 Mar 2024 06:51    TPro  x   /  Alb  x   /  TBili  x   /  DBili  <0.1  /  AST  x   /  ALT  x   /  AlkPhos  x   03-16          Urinalysis Basic - ( 16 Mar 2024 06:51 )    Color: x / Appearance: x / SG: x / pH: x  Gluc: 102 mg/dL / Ketone: x  / Bili: x / Urobili: x   Blood: x / Protein: x / Nitrite: x   Leuk Esterase: x / RBC: x / WBC x   Sq Epi: x / Non Sq Epi: x / Bacteria: x          All consultant(s) notes reviewed and care discussed with other providers        Contact Number, Dr Apple [phone]
Follow-up Pulm Progress Note    No new respiratory events overnight.  Denies SOB/CP.     Medications:  MEDICATIONS  (STANDING):  albuterol/ipratropium for Nebulization 3 milliLiter(s) Nebulizer every 6 hours  atorvastatin 20 milliGRAM(s) Oral at bedtime  cefTRIAXone   IVPB      chlorhexidine 2% Cloths 1 Application(s) Topical daily  enoxaparin Injectable 40 milliGRAM(s) SubCutaneous every 24 hours  latanoprost 0.005% Ophthalmic Solution 1 Drop(s) Both EYES at bedtime  timolol 0.5% Solution 1 Drop(s) Both EYES two times a day    MEDICATIONS  (PRN):  acetaminophen     Tablet .. 650 milliGRAM(s) Oral every 6 hours PRN Temp greater or equal to 38C (100.4F), Mild Pain (1 - 3)  ondansetron Injectable 4 milliGRAM(s) IV Push every 6 hours PRN Nausea and/or Vomiting          Vital Signs Last 24 Hrs  T(C): 36.7 (12 Mar 2024 11:10), Max: 36.7 (11 Mar 2024 16:23)  T(F): 98 (12 Mar 2024 11:10), Max: 98 (11 Mar 2024 16:23)  HR: 82 (12 Mar 2024 11:10) (82 - 90)  BP: 148/78 (12 Mar 2024 11:10) (118/72 - 148/78)  BP(mean): --  RR: 18 (12 Mar 2024 11:10) (18 - 18)  SpO2: 96% (12 Mar 2024 11:10) (94% - 96%)    Parameters below as of 12 Mar 2024 11:10  Patient On (Oxygen Delivery Method): nasal cannula  O2 Flow (L/min): 4        VBG pH 7.36 03-10 @ 18:37    VBG pCO2 35 03-10 @ 18:37    VBG O2 sat 60.2 03-10 @ 18:37    VBG lactate 3.9 03-10 @ 18:37  VBG pH 7.31 03-10 @ 17:05    VBG pCO2 27 03-10 @ 17:05    VBG O2 sat 72.0 03-10 @ 17:05    VBG lactate 2.3 03-10 @ 17:05  VBG pH 7.40 03-10 @ 14:21    VBG pCO2 38 03-10 @ 14:21    VBG O2 sat 55.4 03-10 @ 14:21    VBG lactate 3.2 03-10 @ 14:21            LABS:                        10.3   30.42 )-----------( 135      ( 12 Mar 2024 07:16 )             33.4     03-12    141  |  106  |  29<H>  ----------------------------<  75  3.6   |  21<L>  |  0.94    Ca    8.7      12 Mar 2024 07:16  Phos  2.5     03-12  Mg     1.9     03-12    TPro  6.0  /  Alb  3.2<L>  /  TBili  0.7  /  DBili  x   /  AST  25  /  ALT  25  /  AlkPhos  123<H>  03-12      CARDIAC MARKERS ( 10 Mar 2024 14:30 )  x     / x     / 41 U/L / x     / x          CAPILLARY BLOOD GLUCOSE      POCT Blood Glucose.: 130 mg/dL (10 Mar 2024 14:14)    PT/INR - ( 10 Mar 2024 14:30 )   PT: 12.0 sec;   INR: 1.15 ratio         PTT - ( 10 Mar 2024 14:30 )  PTT:32.6 sec  Urinalysis Basic - ( 12 Mar 2024 07:16 )    Color: x / Appearance: x / SG: x / pH: x  Gluc: 75 mg/dL / Ketone: x  / Bili: x / Urobili: x   Blood: x / Protein: x / Nitrite: x   Leuk Esterase: x / RBC: x / WBC x   Sq Epi: x / Non Sq Epi: x / Bacteria: x                      CULTURES:     Culture - Blood (collected 03-10-24 @ 14:25)  Source: .Blood Blood-Peripheral  Gram Stain (03-11-24 @ 04:31):    Growth in aerobic bottle: Gram Negative Rods    Growth in anaerobic bottle: Gram Negative Rods  Final Report (03-12-24 @ 11:44):    Growth in aerobic and anaerobic bottles: Escherichia coli    See previous culture [id number]    Culture - Blood (collected 03-10-24 @ 14:20)  Source: .Blood Blood-Peripheral  Gram Stain (03-11-24 @ 04:30):    Growth in aerobic bottle: Gram Negative Rods    Growth in anaerobic bottle: Gram Negative Rods  Final Report (03-12-24 @ 11:43):    Growth in aerobic and anaerobic bottles: Escherichia coli    Direct identification is available within approximately 3-5    hours either by Blood Panel Multiplexed PCR or Direct    MALDI-TOF. Details: [email or web address]  Organism: Blood Culture PCR  Escherichia coli (03-12-24 @ 11:43)  Organism: Escherichia coli (03-12-24 @ 11:43)      Method Type: RAFFAELE      -  Ampicillin: S <=8 These ampicillin results predict results for amoxicillin      -  Ampicillin/Sulbactam: S <=4/2      -  Aztreonam: S <=4      -  Cefazolin: S <=2      -  Cefepime: S <=2      -  Cefoxitin: S <=8      -  Ceftriaxone: S <=1      -  Ciprofloxacin: S <=0.25      -  Ertapenem: S <=0.5      -  Gentamicin: S <=2      -  Imipenem: S <=1      -  Levofloxacin: S <=0.5      -  Meropenem: S <=1      -  Piperacillin/Tazobactam: S <=8      -  Tobramycin: S <=2      -  Trimethoprim/Sulfamethoxazole: S <=0.5/9.5  Organism: Blood Culture PCR (03-12-24 @ 11:43)      Method Type: PCR      -  Escherichia coli: Detec        Culture - Urine (collected 03-10-24 @ 15:07)  Source: Clean Catch Clean Catch (Midstream)  Preliminary Report (03-11-24 @ 16:51):    >100,000 CFU/ml Escherichia coli                Physical Examination:  PULM: bibasilar crackles   CVS: S1, S2 heard    RADIOLOGY REVIEWED    CT chest:   < from: CT Chest No Cont (03.11.24 @ 11:27) >  FINDINGS:    LUNGS AND AIRWAYS: Compressed bilateral lower lobe subsegmental bronchi.    Mosaic attenuation pattern of the lungs likely related to expiratory   phase imaging. Bilateral lower lobe dense opacities, left greater than   right. Bilateral scattered calcified granulomas..  PLEURA: Trace left pleural effusion.  MEDIASTINUM AND KELLY: No lymphadenopathy.  VESSELS: Aortic and coronary artery atherosclerotic calcifications.  HEART: Cardiomegaly. No pericardial effusion. Aortic valve calcifications.  CHEST WALL AND LOWER NECK: Within normal limits.  VISUALIZED UPPER ABDOMEN: Small hiatal hernia.Nonspecific mild partially   included left perinephric fat stranding.  BONES: Degenerative changes.    IMPRESSION:  Bilateral lower lobe opacities, which may represent atelectasis and/or   pneumonia.    Traceleft pleural effusion.    < end of copied text >      
Follow-up Pulm Progress Note    OOB to chair  No new respiratory events overnight.  Denies SOB/CP.   Sats currently 95-96% on RA    Medications:  MEDICATIONS  (STANDING):  albuterol/ipratropium for Nebulization 3 milliLiter(s) Nebulizer every 6 hours  atorvastatin 20 milliGRAM(s) Oral at bedtime  cefuroxime   Tablet 500 milliGRAM(s) Oral every 12 hours  chlorhexidine 2% Cloths 1 Application(s) Topical daily  enoxaparin Injectable 40 milliGRAM(s) SubCutaneous every 24 hours  latanoprost 0.005% Ophthalmic Solution 1 Drop(s) Both EYES at bedtime  senna 2 Tablet(s) Oral at bedtime  tamsulosin 0.4 milliGRAM(s) Oral at bedtime  timolol 0.5% Solution 1 Drop(s) Both EYES two times a day    MEDICATIONS  (PRN):  acetaminophen     Tablet .. 650 milliGRAM(s) Oral every 6 hours PRN Temp greater or equal to 38C (100.4F), Mild Pain (1 - 3)  ondansetron Injectable 4 milliGRAM(s) IV Push every 6 hours PRN Nausea and/or Vomiting  polyethylene glycol 3350 17 Gram(s) Oral daily PRN Constipation          Vital Signs Last 24 Hrs  T(C): 36.6 (19 Mar 2024 08:24), Max: 36.8 (19 Mar 2024 00:37)  T(F): 97.9 (19 Mar 2024 08:24), Max: 98.2 (19 Mar 2024 00:37)  HR: 76 (19 Mar 2024 08:24) (76 - 89)  BP: 114/73 (19 Mar 2024 08:24) (102/68 - 114/73)  BP(mean): --  RR: 18 (19 Mar 2024 08:24) (18 - 18)  SpO2: 90% (19 Mar 2024 08:24) (90% - 95%)    Parameters below as of 19 Mar 2024 08:24  Patient On (Oxygen Delivery Method): room air              03-18 @ 07:01  -  03-19 @ 07:00  --------------------------------------------------------  IN: 240 mL / OUT: 475 mL / NET: -235 mL          LABS:    03-19    138  |  100  |  22  ----------------------------<  104<H>  3.7   |  26  |  0.87    Ca    9.1      19 Mar 2024 06:57    TPro  6.8  /  Alb  3.4  /  TBili  0.6  /  DBili  x   /  AST  27  /  ALT  68<H>  /  AlkPhos  126<H>  03-19          CAPILLARY BLOOD GLUCOSE          Urinalysis Basic - ( 19 Mar 2024 06:57 )    Color: x / Appearance: x / SG: x / pH: x  Gluc: 104 mg/dL / Ketone: x  / Bili: x / Urobili: x   Blood: x / Protein: x / Nitrite: x   Leuk Esterase: x / RBC: x / WBC x   Sq Epi: x / Non Sq Epi: x / Bacteria: x                      CULTURES:    Culture - Blood (collected 03-12-24 @ 08:10)  Source: .Blood Blood-Peripheral  Final Report (03-17-24 @ 17:00):    No growth at 5 days    Culture - Blood (collected 03-12-24 @ 08:00)  Source: .Blood Blood-Peripheral  Final Report (03-17-24 @ 17:00):    No growth at 5 days    Culture - Blood (collected 03-10-24 @ 14:25)  Source: .Blood Blood-Peripheral  Gram Stain (03-11-24 @ 04:31):    Growth in aerobic bottle: Gram Negative Rods    Growth in anaerobic bottle: Gram Negative Rods  Final Report (03-12-24 @ 11:44):    Growth in aerobic and anaerobic bottles: Escherichia coli    See previous culture [id number]    Culture - Blood (collected 03-10-24 @ 14:20)  Source: .Blood Blood-Peripheral  Gram Stain (03-11-24 @ 04:30):    Growth in aerobic bottle: Gram Negative Rods    Growth in anaerobic bottle: Gram Negative Rods  Final Report (03-12-24 @ 11:43):    Growth in aerobic and anaerobic bottles: Escherichia coli    Direct identification is available within approximately 3-5    hours either by Blood Panel Multiplexed PCR or Direct    MALDI-TOF. Details: [email or web address]  Organism: Blood Culture PCR  Escherichia coli (03-12-24 @ 11:43)  Organism: Escherichia coli (03-12-24 @ 11:43)      Method Type: RAFFAELE      -  Ampicillin: S <=8 These ampicillin results predict results for amoxicillin      -  Ampicillin/Sulbactam: S <=4/2      -  Aztreonam: S <=4      -  Cefazolin: S <=2      -  Cefepime: S <=2      -  Cefoxitin: S <=8      -  Ceftriaxone: S <=1      -  Ciprofloxacin: S <=0.25      -  Ertapenem: S <=0.5      -  Gentamicin: S <=2      -  Imipenem: S <=1      -  Levofloxacin: S <=0.5      -  Meropenem: S <=1      -  Piperacillin/Tazobactam: S <=8      -  Tobramycin: S <=2      -  Trimethoprim/Sulfamethoxazole: S <=0.5/9.5  Organism: Blood Culture PCR (03-12-24 @ 11:43)      Method Type: PCR      -  Escherichia coli: Detec        Culture - Urine (collected 03-10-24 @ 15:07)  Source: Clean Catch Clean Catch (Midstream)  Final Report (03-13-24 @ 09:25):    >100,000 CFU/ml Escherichia coli  Organism: Escherichia coli (03-13-24 @ 09:25)  Organism: Escherichia coli (03-13-24 @ 09:25)      Method Type: RAFFAELE      -  Amoxicillin/Clavulanic Acid: S <=8/4      -  Ampicillin: S <=8 These ampicillin results predict results for amoxicillin      -  Ampicillin/Sulbactam: S <=4/2      -  Aztreonam: S <=4      -  Cefazolin: S <=2 For uncomplicated UTI with K. pneumoniae, E. coli, or P. mirablis: RAFFAELE <=16 is sensitive and RAFFAELE >=32 is resistant. This also predicts results for oral agents cefaclor, cefdinir, cefpodoxime, cefprozil, cefuroxime axetil, cephalexin and locarbef for uncomplicated UTI. Note that some isolates may be susceptible to these agents while testing resistant to cefazolin.      -  Cefepime: S <=2      -  Cefoxitin: S <=8      -  Ceftriaxone: S <=1      -  Cefuroxime: S <=4      -  Ciprofloxacin: S <=0.25      -  Ertapenem: S <=0.5      -  Gentamicin: S <=2      -  Imipenem: S <=1      -  Levofloxacin: S <=0.5      -  Meropenem: S <=1      -  Nitrofurantoin: S <=32 Should not be used to treat pyelonephritis      -  Piperacillin/Tazobactam: S <=8      -  Tobramycin: S <=2      -  Trimethoprim/Sulfamethoxazole: S <=0.5/9.5                    Physical Examination:  PULM: grossly clear   CVS: S1, S2 heard    RADIOLOGY REVIEWED    CT chest:   < from: CT Chest No Cont (03.11.24 @ 11:27) >  FINDINGS:    LUNGS AND AIRWAYS: Compressed bilateral lower lobe subsegmental bronchi.    Mosaic attenuation pattern of the lungs likely related to expiratory   phase imaging. Bilateral lower lobe dense opacities, left greater than   right. Bilateral scattered calcified granulomas..  PLEURA: Trace left pleural effusion.  MEDIASTINUM AND KELYL: No lymphadenopathy.  VESSELS: Aortic and coronary artery atherosclerotic calcifications.  HEART: Cardiomegaly. No pericardial effusion. Aortic valve calcifications.  CHEST WALL AND LOWER NECK: Within normal limits.  VISUALIZED UPPER ABDOMEN: Small hiatal hernia.Nonspecific mild partially   included left perinephric fat stranding.  BONES: Degenerative changes.    IMPRESSION:  Bilateral lower lobe opacities, which may represent atelectasis and/or   pneumonia.    Traceleft pleural effusion.    < end of copied text >
Follow-up Pulm Progress Note    No new respiratory events overnight.  Denies SOB/CP.   O2 lowered from 4LNC to 2LNC     Medications:  MEDICATIONS  (STANDING):  albuterol/ipratropium for Nebulization 3 milliLiter(s) Nebulizer every 6 hours  atorvastatin 20 milliGRAM(s) Oral at bedtime  cefTRIAXone   IVPB 1000 milliGRAM(s) IV Intermittent every 24 hours  cefTRIAXone   IVPB      chlorhexidine 2% Cloths 1 Application(s) Topical daily  enoxaparin Injectable 40 milliGRAM(s) SubCutaneous every 24 hours  latanoprost 0.005% Ophthalmic Solution 1 Drop(s) Both EYES at bedtime  senna 2 Tablet(s) Oral at bedtime  timolol 0.5% Solution 1 Drop(s) Both EYES two times a day    MEDICATIONS  (PRN):  acetaminophen     Tablet .. 650 milliGRAM(s) Oral every 6 hours PRN Temp greater or equal to 38C (100.4F), Mild Pain (1 - 3)  ondansetron Injectable 4 milliGRAM(s) IV Push every 6 hours PRN Nausea and/or Vomiting  polyethylene glycol 3350 17 Gram(s) Oral daily PRN Constipation          Vital Signs Last 24 Hrs  T(C): 36.3 (13 Mar 2024 09:28), Max: 37 (12 Mar 2024 23:38)  T(F): 97.4 (13 Mar 2024 09:28), Max: 98.6 (12 Mar 2024 23:38)  HR: 80 (13 Mar 2024 09:28) (80 - 82)  BP: 143/75 (13 Mar 2024 09:28) (136/75 - 148/78)  BP(mean): --  RR: 18 (13 Mar 2024 09:28) (18 - 18)  SpO2: 95% (13 Mar 2024 09:28) (94% - 96%)    Parameters below as of 13 Mar 2024 09:28  Patient On (Oxygen Delivery Method): nasal cannula  O2 Flow (L/min): 4 03-12 @ 07:01  -  03-13 @ 07:00  --------------------------------------------------------  IN: 100 mL / OUT: 700 mL / NET: -600 mL          LABS:                        10.0   23.63 )-----------( 124      ( 13 Mar 2024 07:11 )             31.0     03-13    139  |  104  |  26<H>  ----------------------------<  85  3.9   |  23  |  0.96    Ca    9.0      13 Mar 2024 07:10  Phos  2.5     03-12  Mg     1.9     03-12    TPro  5.8<L>  /  Alb  3.0<L>  /  TBili  0.8  /  DBili  x   /  AST  74<H>  /  ALT  61<H>  /  AlkPhos  202<H>  03-13          CAPILLARY BLOOD GLUCOSE          Urinalysis Basic - ( 13 Mar 2024 07:10 )    Color: x / Appearance: x / SG: x / pH: x  Gluc: 85 mg/dL / Ketone: x  / Bili: x / Urobili: x   Blood: x / Protein: x / Nitrite: x   Leuk Esterase: x / RBC: x / WBC x   Sq Epi: x / Non Sq Epi: x / Bacteria: x                      CULTURES:    Culture - Blood (collected 03-10-24 @ 14:25)  Source: .Blood Blood-Peripheral  Gram Stain (03-11-24 @ 04:31):    Growth in aerobic bottle: Gram Negative Rods    Growth in anaerobic bottle: Gram Negative Rods  Final Report (03-12-24 @ 11:44):    Growth in aerobic and anaerobic bottles: Escherichia coli    See previous culture [id number]    Culture - Blood (collected 03-10-24 @ 14:20)  Source: .Blood Blood-Peripheral  Gram Stain (03-11-24 @ 04:30):    Growth in aerobic bottle: Gram Negative Rods    Growth in anaerobic bottle: Gram Negative Rods  Final Report (03-12-24 @ 11:43):    Growth in aerobic and anaerobic bottles: Escherichia coli    Direct identification is available within approximately 3-5    hours either by Blood Panel Multiplexed PCR or Direct    MALDI-TOF. Details: [email or web address]  Organism: Blood Culture PCR  Escherichia coli (03-12-24 @ 11:43)  Organism: Escherichia coli (03-12-24 @ 11:43)      -  Levofloxacin: S <=0.5      -  Tobramycin: S <=2      -  Aztreonam: S <=4      -  Gentamicin: S <=2      -  Cefazolin: S <=2      -  Cefepime: S <=2      -  Piperacillin/Tazobactam: S <=8      -  Ciprofloxacin: S <=0.25      -  Imipenem: S <=1      -  Ceftriaxone: S <=1      -  Ampicillin: S <=8 These ampicillin results predict results for amoxicillin      Method Type: RAFFAELE      -  Meropenem: S <=1      -  Ampicillin/Sulbactam: S <=4/2      -  Cefoxitin: S <=8      -  Trimethoprim/Sulfamethoxazole: S <=0.5/9.5      -  Ertapenem: S <=0.5  Organism: Blood Culture PCR (03-12-24 @ 11:43)      -  Escherichia coli: Detec      Method Type: PCR        Culture - Urine (collected 03-10-24 @ 15:07)  Source: Clean Catch Clean Catch (Midstream)  Final Report (03-13-24 @ 09:25):    >100,000 CFU/ml Escherichia coli  Organism: Escherichia coli (03-13-24 @ 09:25)  Organism: Escherichia coli (03-13-24 @ 09:25)      -  Levofloxacin: S <=0.5      -  Tobramycin: S <=2      -  Nitrofurantoin: S <=32 Should not be used to treat pyelonephritis      -  Aztreonam: S <=4      -  Gentamicin: S <=2      -  Cefazolin: S <=2 For uncomplicated UTI with K. pneumoniae, E. coli, or P. mirablis: RAFFAELE <=16 is sensitive and RAFFAELE >=32 is resistant. This also predicts results for oral agents cefaclor, cefdinir, cefpodoxime, cefprozil, cefuroxime axetil, cephalexin and locarbef for uncomplicated UTI. Note that some isolates may be susceptible to these agents while testing resistant to cefazolin.      -  Cefepime: S <=2      -  Piperacillin/Tazobactam: S <=8      -  Ciprofloxacin: S <=0.25      -  Imipenem: S <=1      -  Ceftriaxone: S <=1      -  Ampicillin: S <=8 These ampicillin results predict results for amoxicillin      Method Type: RAFFAELE      -  Meropenem: S <=1      -  Ampicillin/Sulbactam: S <=4/2      -  Cefoxitin: S <=8      -  Cefuroxime: S <=4      -  Amoxicillin/Clavulanic Acid: S <=8/4      -  Trimethoprim/Sulfamethoxazole: S <=0.5/9.5      -  Ertapenem: S <=0.5                Physical Examination:  PULM: bibasilar crackles   CVS: S1, S2 heard    RADIOLOGY REVIEWED    CT chest:   < from: CT Chest No Cont (03.11.24 @ 11:27) >  FINDINGS:    LUNGS AND AIRWAYS: Compressed bilateral lower lobe subsegmental bronchi.    Mosaic attenuation pattern of the lungs likely related to expiratory   phase imaging. Bilateral lower lobe dense opacities, left greater than   right. Bilateral scattered calcified granulomas..  PLEURA: Trace left pleural effusion.  MEDIASTINUM AND KELLY: No lymphadenopathy.  VESSELS: Aortic and coronary artery atherosclerotic calcifications.  HEART: Cardiomegaly. No pericardial effusion. Aortic valve calcifications.  CHEST WALL AND LOWER NECK: Within normal limits.  VISUALIZED UPPER ABDOMEN: Small hiatal hernia.Nonspecific mild partially   included left perinephric fat stranding.  BONES: Degenerative changes.    IMPRESSION:  Bilateral lower lobe opacities, which may represent atelectasis and/or   pneumonia.    Traceleft pleural effusion.    < end of copied text >  
Patient is a 83y old  Female who presents with a chief complaint of Urosepsis (16 Mar 2024 16:07)      DATE OF SERVICE: 03-17-24 @ 15:12    SUBJECTIVE / OVERNIGHT EVENTS: overnight events noted    ROS:  Resp: No cough no sputum production  CVS: No chest pain no palpitations no orthopnea  GI: no N/V/D  "I feel much better"     MEDICATIONS  (STANDING):  albuterol/ipratropium for Nebulization 3 milliLiter(s) Nebulizer every 6 hours  atorvastatin 20 milliGRAM(s) Oral at bedtime  cefTRIAXone   IVPB 1000 milliGRAM(s) IV Intermittent every 24 hours  cefTRIAXone   IVPB      chlorhexidine 2% Cloths 1 Application(s) Topical daily  enoxaparin Injectable 40 milliGRAM(s) SubCutaneous every 24 hours  latanoprost 0.005% Ophthalmic Solution 1 Drop(s) Both EYES at bedtime  senna 2 Tablet(s) Oral at bedtime  tamsulosin 0.4 milliGRAM(s) Oral at bedtime  timolol 0.5% Solution 1 Drop(s) Both EYES two times a day    MEDICATIONS  (PRN):  acetaminophen     Tablet .. 650 milliGRAM(s) Oral every 6 hours PRN Temp greater or equal to 38C (100.4F), Mild Pain (1 - 3)  ondansetron Injectable 4 milliGRAM(s) IV Push every 6 hours PRN Nausea and/or Vomiting  polyethylene glycol 3350 17 Gram(s) Oral daily PRN Constipation        CAPILLARY BLOOD GLUCOSE        I&O's Summary    16 Mar 2024 07:01  -  17 Mar 2024 07:00  --------------------------------------------------------  IN: 0 mL / OUT: 555 mL / NET: -555 mL    17 Mar 2024 07:01  -  17 Mar 2024 15:12  --------------------------------------------------------  IN: 50 mL / OUT: 0 mL / NET: 50 mL        Vital Signs Last 24 Hrs  T(C): 36.8 (17 Mar 2024 08:23), Max: 37.1 (16 Mar 2024 23:47)  T(F): 98.2 (17 Mar 2024 08:23), Max: 98.8 (16 Mar 2024 23:47)  HR: 82 (17 Mar 2024 13:37) (69 - 86)  BP: 142/61 (17 Mar 2024 08:23) (132/81 - 142/61)  BP(mean): --  RR: 18 (17 Mar 2024 14:54) (18 - 18)  SpO2: 95% (17 Mar 2024 14:54) (95% - 100%)    PHYSICAL EXAM:  CHEST/LUNG: few crackles lung bases  HEART: S1 S2; no murmurs   ABDOMEN: Soft, Nontender  EXTREMITIES:  no edema  NEUROLOGY: AO x 3 non-focal    LABS:                        9.3    11.75 )-----------( 220      ( 17 Mar 2024 07:34 )             29.5     03-17    136  |  100  |  18  ----------------------------<  101<H>  3.9   |  22  |  0.75    Ca    8.9      17 Mar 2024 07:34    TPro  6.2  /  Alb  3.1<L>  /  TBili  0.5  /  DBili  x   /  AST  55<H>  /  ALT  104<H>  /  AlkPhos  141<H>  03-17          Urinalysis Basic - ( 17 Mar 2024 07:34 )    Color: x / Appearance: x / SG: x / pH: x  Gluc: 101 mg/dL / Ketone: x  / Bili: x / Urobili: x   Blood: x / Protein: x / Nitrite: x   Leuk Esterase: x / RBC: x / WBC x   Sq Epi: x / Non Sq Epi: x / Bacteria: x          All consultant(s) notes reviewed and care discussed with other providers        Contact Number, Dr Apple [phone]
Patient is a 83y old  Female who presents with a chief complaint of Urosepsis (13 Mar 2024 10:25)      DATE OF SERVICE: 03-13-24 @ 16:39    SUBJECTIVE / OVERNIGHT EVENTS: overnight events noted    ROS:  Resp: No cough no sputum production  CVS: No chest pain no palpitations no orthopnea  GI: no N/V/D  "I feel a little better"       MEDICATIONS  (STANDING):  albuterol/ipratropium for Nebulization 3 milliLiter(s) Nebulizer every 6 hours  atorvastatin 20 milliGRAM(s) Oral at bedtime  cefTRIAXone   IVPB 1000 milliGRAM(s) IV Intermittent every 24 hours  cefTRIAXone   IVPB      chlorhexidine 2% Cloths 1 Application(s) Topical daily  enoxaparin Injectable 40 milliGRAM(s) SubCutaneous every 24 hours  latanoprost 0.005% Ophthalmic Solution 1 Drop(s) Both EYES at bedtime  senna 2 Tablet(s) Oral at bedtime  tamsulosin 0.4 milliGRAM(s) Oral at bedtime  timolol 0.5% Solution 1 Drop(s) Both EYES two times a day    MEDICATIONS  (PRN):  acetaminophen     Tablet .. 650 milliGRAM(s) Oral every 6 hours PRN Temp greater or equal to 38C (100.4F), Mild Pain (1 - 3)  ondansetron Injectable 4 milliGRAM(s) IV Push every 6 hours PRN Nausea and/or Vomiting  polyethylene glycol 3350 17 Gram(s) Oral daily PRN Constipation        CAPILLARY BLOOD GLUCOSE        I&O's Summary    12 Mar 2024 07:01  -  13 Mar 2024 07:00  --------------------------------------------------------  IN: 100 mL / OUT: 700 mL / NET: -600 mL    13 Mar 2024 07:01  -  13 Mar 2024 16:39  --------------------------------------------------------  IN: 240 mL / OUT: 0 mL / NET: 240 mL        Vital Signs Last 24 Hrs  T(C): 36.3 (13 Mar 2024 09:28), Max: 37 (12 Mar 2024 23:38)  T(F): 97.4 (13 Mar 2024 09:28), Max: 98.6 (12 Mar 2024 23:38)  HR: 80 (13 Mar 2024 09:28) (80 - 80)  BP: 143/75 (13 Mar 2024 09:28) (136/75 - 143/75)  BP(mean): --  RR: 18 (13 Mar 2024 09:28) (18 - 18)  SpO2: 95% (13 Mar 2024 09:28) (94% - 96%)    PHYSICAL EXAM:  EYES: EOMI, PERRL  NECK: Supple, No JVD  CHEST/LUNG: basal crackles bilateral   HEART: S1 S2; no murmurs appreciated  ABDOMEN: Soft, Nontender  EXTREMITIES:  ,  no edema  NEUROLOGY: AO x 3 non-focal    LABS:                        10.0   23.63 )-----------( 124      ( 13 Mar 2024 07:11 )             31.0     03-13    139  |  104  |  26<H>  ----------------------------<  85  3.9   |  23  |  0.96    Ca    9.0      13 Mar 2024 07:10  Phos  2.5     03-12  Mg     1.9     03-12    TPro  5.8<L>  /  Alb  3.0<L>  /  TBili  0.8  /  DBili  x   /  AST  74<H>  /  ALT  61<H>  /  AlkPhos  202<H>  03-13          Urinalysis Basic - ( 13 Mar 2024 07:10 )    Color: x / Appearance: x / SG: x / pH: x  Gluc: 85 mg/dL / Ketone: x  / Bili: x / Urobili: x   Blood: x / Protein: x / Nitrite: x   Leuk Esterase: x / RBC: x / WBC x   Sq Epi: x / Non Sq Epi: x / Bacteria: x          All consultant(s) notes reviewed and care discussed with other providers        Contact Number, Dr Apple [phone]
Patient is a 83y old  Female who presents with a chief complaint of Urosepsis (19 Mar 2024 11:38)      DATE OF SERVICE: 03-19-24 @ 14:39    SUBJECTIVE / OVERNIGHT EVENTS: overnight events noted    ROS:  Resp: No cough no sputum production  CVS: No chest pain no palpitations no orthopnea  GI: no N/V/D  'I feel fine'         MEDICATIONS  (STANDING):  albuterol/ipratropium for Nebulization 3 milliLiter(s) Nebulizer every 6 hours  atorvastatin 20 milliGRAM(s) Oral at bedtime  cefuroxime   Tablet 500 milliGRAM(s) Oral every 12 hours  chlorhexidine 2% Cloths 1 Application(s) Topical daily  enoxaparin Injectable 40 milliGRAM(s) SubCutaneous every 24 hours  latanoprost 0.005% Ophthalmic Solution 1 Drop(s) Both EYES at bedtime  senna 2 Tablet(s) Oral at bedtime  tamsulosin 0.4 milliGRAM(s) Oral at bedtime  timolol 0.5% Solution 1 Drop(s) Both EYES two times a day    MEDICATIONS  (PRN):  acetaminophen     Tablet .. 650 milliGRAM(s) Oral every 6 hours PRN Temp greater or equal to 38C (100.4F), Mild Pain (1 - 3)  ondansetron Injectable 4 milliGRAM(s) IV Push every 6 hours PRN Nausea and/or Vomiting  polyethylene glycol 3350 17 Gram(s) Oral daily PRN Constipation        CAPILLARY BLOOD GLUCOSE        I&O's Summary    18 Mar 2024 07:01  -  19 Mar 2024 07:00  --------------------------------------------------------  IN: 240 mL / OUT: 475 mL / NET: -235 mL        Vital Signs Last 24 Hrs  T(C): 36.6 (19 Mar 2024 08:24), Max: 36.8 (19 Mar 2024 00:37)  T(F): 97.9 (19 Mar 2024 08:24), Max: 98.2 (19 Mar 2024 00:37)  HR: 76 (19 Mar 2024 08:24) (76 - 89)  BP: 114/73 (19 Mar 2024 08:24) (102/68 - 114/73)  BP(mean): --  RR: 18 (19 Mar 2024 08:24) (18 - 18)  SpO2: 90% (19 Mar 2024 08:24) (90% - 95%)    PHYSICAL EXAM:  CHEST/LUNG: clear   HEART: S1 S2; no murmurs   ABDOMEN: Soft, Nontender  EXTREMITIES:  no edema  NEUROLOGY: AO x 3 non-focal    LABS:    03-19    138  |  100  |  22  ----------------------------<  104<H>  3.7   |  26  |  0.87    Ca    9.1      19 Mar 2024 06:57    TPro  6.8  /  Alb  3.4  /  TBili  0.6  /  DBili  x   /  AST  27  /  ALT  68<H>  /  AlkPhos  126<H>  03-19          Urinalysis Basic - ( 19 Mar 2024 06:57 )    Color: x / Appearance: x / SG: x / pH: x  Gluc: 104 mg/dL / Ketone: x  / Bili: x / Urobili: x   Blood: x / Protein: x / Nitrite: x   Leuk Esterase: x / RBC: x / WBC x   Sq Epi: x / Non Sq Epi: x / Bacteria: x          All consultant(s) notes reviewed and care discussed with other providers        Contact Number, Dr Apple [phone]

## 2024-03-19 NOTE — PROGRESS NOTE ADULT - NSPROGADDITIONALINFOA_GEN_ALL_CORE
discussed with daughter at bedside in detail
discussed with patient in detail, expresses understanding of treatment plans.  discussed with daughter at bedside in detail
discussed with daughter at bedside in detail
discussed with patient in detail, expresses understanding of treatment plans.  discussed with daughters at bedside in detail
discussed with daughter at bedside in detail   discharge to Subacute Rehab likely Monday
discussed with daughter at bedside in detail  eventual discharge to subacute rehab
discussed with case management  the patient is stable for discharge to Subacute Rehab when bed available

## 2024-03-19 NOTE — DISCHARGE NOTE NURSING/CASE MANAGEMENT/SOCIAL WORK - PATIENT PORTAL LINK FT
You can access the FollowMyHealth Patient Portal offered by [facility] by registering at the following website: [email or web address]. By joining [facility]’s FollowMyHealth portal, you will also be able to view your health information using other applications (apps) compatible with our system.

## 2024-03-19 NOTE — PROGRESS NOTE ADULT - REASON FOR ADMISSION
Urosepsis

## 2024-03-19 NOTE — PROGRESS NOTE ADULT - PROBLEM SELECTOR PLAN 1
[facility] +E.Coli   -Continue abx as per ID
[facility] +E.Coli   -Continue abx as per ID
on admission likely secondary to sepsis secondary  to UTI and bacteremia   resolved   continue to monitor
on admission likely secondary to sepsis secondary  to UTI and bacteremia   resolved   continue to monitor
on admission likely secondary to sepsis secondary  to UTI and bacteremia   resolving   continue to monitor
[facility] +E.Coli   -Continue abx as per ID
resolved   echocardiogram noted  normal LV function  likely mild HFpEF as expected  did very well with IV furosemide  discharge on 20 mg po qd
[facility] +E.Coli   -Continue abx as per ID
on admission likely secondary to sepsis secondary  to UTI and bacteremia   resolved   continue to monitor  she has some basal crackles   ? atelectasis vs fluid overload  will order echocardiogram  furosemide 20 x 1  pro-BNP in AM labs
[facility] +E.Coli   -Continue abx as per ID
[facility] +E.Coli   -Continue abx as per ID
on admission likely secondary to sepsis secondary  to UTI and bacteremia   resolved   continue to monitor  she had some basal crackles  yesterday which have resolved since after furosemide 20 IVP x 1  echocardiogram noted   normal LV function  likely has an element of HFpEF  gentle diuresis with furosemide 20 po qd on discharge
on admission likely secondary to sepsis secondary  to UTI and bacteremia   resolving   continue to monitor

## 2024-03-19 NOTE — PROGRESS NOTE ADULT - PROBLEM SELECTOR PLAN 4
continue home Atorvastatin 20mg qhs  compliance reinforced at length
continue home Atorvastatin 20mg qhs
continue home Atorvastatin 20mg qhs  compliance reinforced at length

## 2024-03-19 NOTE — PROGRESS NOTE ADULT - ASSESSMENT
82 y/o F with PMH of HLD, macular degeneration, thalassemia minor and melanoma. Presents with AMS and hypoxia. Pt found to be febrile, tachycardic with hypoxia (to 80s on RA per EMS). Labs notable for leukocytosis, BC+ GNR. CXR with LLL opacity and UA+.
84yo F w/ HLD, macular degeneration, thalassemia minor and melanoma pw AMS and hypoxia found to have sepsis 2/2 UTI.  neuro called for AMS and abnormal CTH   In the ED febrile, tachy w/ hypoxia (80% on RA per EMS) w/ lactic acidosis w/ positive UA and CXR w/ c/f PNA vs atelectasis s/p Vanc, Zosyn, 2L IVF and HFNC for hypoxia.   + UA  Ucx with E. coli   blood cx with + GNR   + leukocytosis   CTH with L middle frontal hyperattenuation likely mineralization.  Bilateral occipital encephalomalacia   CT chest with B/L lower lobe opacities c/f atelectasis and/or PNA    CT abd: mild L hydro; superficial errosion of gastric fundus. endometrial thickening   MR pelvix done     Imprssion:   1) AMS likely toxic metabolic encephalopahty from sepsis 2/2 UTI   2) abnormal CTH.  bilateral occipital lobe encephalomalacia --> unclear etiology no known h/o strokes.  also frontal hyperattenuation likely mineralization     - zosyn--> changed to CTX for infection/ UTI   - on atorvastatin 20 QHS   - at some point non urgent would get MRI brain for further clarification of occiptial findings.  given encephalomalacia it seems chronic but possible old strokes.   - would start ASA 81mg daily for now based on CTH findings if no objection  - f/u ID    - GI and  eval   - Hemoglobin A1c and lipid panel  - b12, TSH if not already checked   - PT/OT/SS/SLP, OOBC  - check FS, glucose control <180  - GI/DVT ppx   - Thank you for allowing me to participate in the care of this patient. Call with questions.   - spoke with daughter in ED 3/11 and 3/12, 3/14 bedside    Elias Leggett MD  Vascular Neurology  Office: [phone]     
82yo F w/ HLD, macular degeneration, thalassemia minor and melanoma pw AMS and hypoxia found to have sepsis 2/2 UTI.  neuro called for AMS and abnormal CTH   In the ED febrile, tachy w/ hypoxia (80% on RA per EMS) w/ lactic acidosis w/ positive UA and CXR w/ c/f PNA vs atelectasis s/p Vanc, Zosyn, 2L IVF and HFNC for hypoxia.   + UA  Ucx with E. coli   blood cx with + GNR   + leukocytosis   CTH with L middle frontal hyperattenuation likely mineralization.  Bilateral occipital encephalomalacia   CT chest with B/L lower lobe opacities c/f atelectasis and/or PNA    CT abd: mild L hydro; superficial errosion of gastric fundus. endometrial thickening   MR pelvix done     Imprssion:   1) AMS likely toxic metabolic encephalopahty from sepsis 2/2 UTI   2) abnormal CTH.  bilateral occipital lobe encephalomalacia --> unclear etiology no known h/o strokes.  also frontal hyperattenuation likely mineralization        - zosyn--> changed to CTX for infection/ UTI --> now cefuroxime   - on atorvastatin 20 QHS   - at some point non urgent would get MRI brain for further clarification of occiptial findings.  given encephalomalacia it seems chronic but possible old strokes.   - would start ASA 81mg daily for now based on CTH findings if no objection  - f/u ID    - GI and  eval   - Hemoglobin A1c and lipid panel  - b12, TSH if not already checked   - PT/OT/SS/SLP, OOBC  - check FS, glucose control <180  - GI/DVT ppx   - Thank you for allowing me to participate in the care of this patient. Call with questions.   - spoke with daughter in ED 3/11 and 3/12, 3/14 bedside , 3/18 , 3/19  dc planning    Elias Leggett MD  Vascular Neurology  Office: [phone]     
83 year old presents with leukocytosis, confusion weakness. BC are positive for Ecoli with no resistance markers  Denies any focal complaints and abdominal exam is benign.  no UTI symptoms    E.coli sepsis   urine culture positive too   Denies focal complaints     Feels  better but wbc up    uro sepsis   ct scan mild hydro but no obstrction   ceftriaxone  Day 4 of antibiotics    much better  alert  eating out of bed  no change at present  
83 year old presents with leukocytosis, confusion weakness. BC are positive for Ecoli with no resistance markers  Denies any focal complaints and abdominal exam is benign.  no UTI symptoms    E.coli sepsis   urine culture positive too   Denies focal complaints     Feels  better but wbc up    uro sepsis  awaiting ct scan of abd. to ro obstruction   will change to ceftriaxone as no resistance markers seen 
84yo F w/ HLD, macular degeneration, thalassemia minor and melanoma pw AMS and hypoxia found to have sepsis 2/2 UTI.  neuro called for AMS and abnormal CTH   In the ED febrile, tachy w/ hypoxia (80% on RA per EMS) w/ lactic acidosis w/ positive UA and CXR w/ c/f PNA vs atelectasis s/p Vanc, Zosyn, 2L IVF and HFNC for hypoxia.   + UA  Ucx with E. coli   blood cx with + GNR   + leukocytosis   CTH with L middle frontal hyperattenuation likely mineralization.  Bilateral occipital encephalomalacia   CT chest with B/L lower lobe opacities c/f atelectasis and/or PNA    CT abd: mild L hydro; superficial errosion of gastric fundus. endometrial thickening   MR pelvix done     Imprssion:   1) AMS likely toxic metabolic encephalopahty from sepsis 2/2 UTI   2) abnormal CTH.  bilateral occipital lobe encephalomalacia --> unclear etiology no known h/o strokes.  also frontal hyperattenuation likely mineralization        - zosyn--> changed to CTX for infection/ UTI --> now cefuroxime   - on atorvastatin 20 QHS   - at some point non urgent would get MRI brain for further clarification of occiptial findings.  given encephalomalacia it seems chronic but possible old strokes.   - would start ASA 81mg daily for now based on CTH findings if no objection  - f/u ID    - GI and  eval   - Hemoglobin A1c and lipid panel  - b12, TSH if not already checked   - PT/OT/SS/SLP, OOBC  - check FS, glucose control <180  - GI/DVT ppx   - Thank you for allowing me to participate in the care of this patient. Call with questions.   - spoke with daughter in ED 3/11 and 3/12, 3/14 bedside , 3/18   dc planning    Elias Leggett MD  Vascular Neurology  Office: [phone]     
This is an 84yo F w/ PMH of HLD, macular degeneration, thalassemia minor and melanoma who initially presented with AMS and hypoxia found to be in severe sepsis with E Coli bacteremia 2/2 UTI. CT A/P with mild left hydronephrosis with urothelial thickening and bilateral perinephric edema, findings of 2 mm non obstructing stone.   Pt started on Ceftriaxone 1 g q24 with improvement, CBC trended to normal and repeat BCx negative.   Pending repeat CT per urology given findings of stone and hydro.    #E Coli bacteremia  #Severe sepsis  #Non obstructing stone   #Thrombocytopenia, improved     Overall E Coli bacteremia 2/2 UTI/pyelonephritis, finding of mild L hydro and R nonobstructive 2mm stone, repeat CT A/P unchanged. Clinically improving at this time, will plan on 10 day course of antibiotics, can switch to oral when ready for d/c.     Plan:   1. On Ceftin now, prefer IV Ceftriaxone while inhouse and changing to Cipro 500 mg BID on discharge to finish 10 day course on 3/19    Thank you for this consult. Inpatient ID consult team will sign off.    Further changes in lab values, imaging studies, or clinical status will not be known to ID inpatient consultants unless specifically communicated by primary team.    Timi Castillo MD  Attending Physician  Division of Infectious Diseases  Department of Medicine    Please contact through MS Teams message.  Office: [phone] (after 5 PM or weekend)    
This is an 84yo F w/ PMH of HLD, macular degeneration, thalassemia minor and melanoma who initially presented with AMS and hypoxia found to be in severe sepsis with E Coli bacteremia 2/2 UTI. CT A/P with mild left hydronephrosis with urothelial thickening and bilateral perinephric edema, findings of 2 mm non obstructing stone.   Pt started on Ceftriaxone 1 g q24 with improvement, CBC trended to normal and repeat BCx negative.   Pending repeat CT per urology given findings of stone and hydro.    #E Coli bacteremia  #Severe sepsis  #Non obstructing stone   #Thrombocytopenia, improved     Overall E Coli bacteremia 2/2 UTI/pyelonephritis, finding of mild L hydro and nonobstructing stone, pending repeat CT pelvis per urology. Clinically improving at this time, will plan on 10 day course of antibiotics, can switch to oral when ready for d/c.     Plan:   1. C/w Ceftriaxone for now   2. When ready for d/c, can switch to Ciprofloxacin 500 mg BID for total 10 day course    Thank you for this consult. Inpatient ID team will follow.    Timi Castillo M.D.  Attending Physician  Division of Infectious Diseases  Department of Medicine    Please contact through MS Teams message.  Office: [phone] (after 5 PM or weekend)  
82 y/o female admitted with AMS found to be septic secondary to ecoli bacteremia.  Urology consulted for left sided hydronephrosis, noted a L 2mm UVJ stone     Plan:  - Trend WBC, improving.  - Monitor patients hemodynamics, fevers and WBC   - Fu ID recommendations, continue abx   - Please page urology immediately if patient has hemodynamic instability or fevers  - Please repeat CT pelvis noncon in 2 days to see if patient has passed her stone (3/15/2024)  - Flomax 0.4mg daily   - Check PVR to ensure patient is emptying well (post void) last documented at 225cc (not after documented void) please repeat PVR  - Strain urine for calculi   - Follow up repeat blood cultures- NGTD    Discussed with Dr. Helena [facility]  [address] [place] [address]  [phone]  
82yo F w/ HLD, macular degeneration, thalassemia minor and melanoma pw AMS and hypoxia found to have sepsis 2/2 UTI.  neuro called for AMS and abnormal CTH   In the ED febrile, tachy w/ hypoxia (80% on RA per EMS) w/ lactic acidosis w/ positive UA and CXR w/ c/f PNA vs atelectasis s/p Vanc, Zosyn, 2L IVF and HFNC for hypoxia.   + UA  Ucx with E. coli   blood cx with + GNR   + leukocytosis   CTH with L middle frontal hyperattenuation likely mineralization.  Bilateral occipital encephalomalacia   CT chest with B/L lower lobe opacities c/f atelectasis and/or PNA    CT abd: mild L hydro; superficial errosion of gastric fundus. endometrial thickening   MR pelvix done     Imprssion:   1) AMS likely toxic metabolic encephalopahty from sepsis 2/2 UTI   2) abnormal CTH.  bilateral occipital lobe encephalomalacia --> unclear etiology no known h/o strokes.  also frontal hyperattenuation likely mineralization     - Ct pelvis look for stone   - zosyn--> changed to CTX for infection/ UTI   - on atorvastatin 20 QHS   - at some point non urgent would get MRI brain for further clarification of occiptial findings.  given encephalomalacia it seems chronic but possible old strokes.   - would start ASA 81mg daily for now based on CTH findings if no objection  - f/u ID    - GI and  eval   - Hemoglobin A1c and lipid panel  - b12, TSH if not already checked   - PT/OT/SS/SLP, OOBC  - check FS, glucose control <180  - GI/DVT ppx   - Thank you for allowing me to participate in the care of this patient. Call with questions.   - spoke with daughter in ED 3/11 and 3/12, 3/14 bedside    Elias Leggett MD  Vascular Neurology  Office: [phone]     
82yo F w/ HLD, macular degeneration, thalassemia minor and melanoma pw AMS and hypoxia found to have sepsis 2/2 UTI.  neuro called for AMS and abnormal CTH   In the ED febrile, tachy w/ hypoxia (80% on RA per EMS) w/ lactic acidosis w/ positive UA and CXR w/ c/f PNA vs atelectasis s/p Vanc, Zosyn, 2L IVF and HFNC for hypoxia.   + UA  Ucx with E. coli   blood cx with + GNR   + leukocytosis   CTH with L middle frontal hyperattenuation likely mineralization.  Bilateral occipital encephalomalacia   CT chest with B/L lower lobe opacities c/f atelectasis and/or PNA   spoke with daughter in ED     Imprssion:   1) AMS likely toxic metabolic encephalopahty from sepsis 2/2 UTI   2) abnormal CTH.  bilateral occipital lobe encephalomalacia --> unclear etiology no known h/o strokes.  also frontal hyperattenuation likely mineralization     - zosyn--> changed to CTX for infection/ UTI   - on atorvastatin 20 QHS   - at some point non urgent would get MRI brain for further clarification of occiptial findings.  given encephalomalacia it seems chronic but possible old strokes.   - would start ASA 81mg daily for now based on CTH findings if no objection  - f/u ID    - CT abd/plevis pending   - Hemoglobin A1c and lipid panel  - b12, TSH if not already checked   - PT/OT/SS/SLP, OOBC  - check FS, glucose control <180  - GI/DVT ppx   - Thank you for allowing me to participate in the care of this patient. Call with questions.   - spoke with daughter in ED 3/11 and 3/12 bedside    Elias Leggett MD  Vascular Neurology  Office: [phone]     
82yo F w/ PMH of HLD, macular degeneration, thalassemia minor and melanoma pw AMS and hypoxia found to be in severe sepsis i/s/o UTI +/- PNA.        Problem/Plan - 1:  ·  Problem: Sepsis with acute hypoxic respiratory failure without septic shock, due to unspecified organism.   ·  Plan: - Febrile w/ leukocytosis and positive UA    - c/w abs  pulm eval   id eval   - MRSA PCR  - Ur legionella neg  - Blood and Ur Cx   on n/c      Problem/Plan - 2:  ·  Problem: Acute UTI.   ·  Plan: - Positive UA w/ use of adult diapers at home  - c/w Abx as above  - f/u Ur cx and narrow abx based on sensitivities.  i d eval     Problem/Plan - 3:  ·  Problem: mild Thrombocytopenia.     - Monitor while inpt     Problem/Plan - 4:  ·  Problem: HLD (hyperlipidemia).   ·  Plan: - c/w home Atorvastatin 20mg qhs     Problem/Plan - 5:  ·  Problem: Macular degeneration.   ·  Plan: - c/w home eye drops.     Problem/Plan - 6:  ·  Problem: Prophylactic measure.   ·  Plan: DVT ppx: Lovenox  Diet: DASH    neuro eval     
82yo F w/ PMH of HLD, macular degeneration, thalassemia minor and melanoma pw AMS and hypoxia found to be in severe sepsis i/s/o UTI +/- PNA.        Problem/Plan - 1:  ·  Problem: Sepsis with acute hypoxic respiratory failure without septic shock, due to unspecified organism.   ·  Plan: - Febrile w/ leukocytosis and positive UA    - c/w abs  pulm eval noted  id eval noted  wbc elevated  check ct a/p  e coli noted  abs as per id   - Ur legionella neg       Problem/Plan - 2:  ·  Problem: Acute UTI.   ·  Plan: - Positive UA w/ use of adult diapers at home  - c/w Abx as above  - f/u Ur cx and narrow abx based on sensitivities.  i d eval noted     Problem/Plan - 3:  ·  Problem: mild Thrombocytopenia.     - Monitor while inpt     Problem/Plan - 4:  ·  Problem: HLD (hyperlipidemia).   ·  Plan: - c/w home Atorvastatin 20mg qhs     Problem/Plan - 5:  ·  Problem: Macular degeneration.   ·  Plan: - c/w home eye drops.     Problem/Plan - 6:  ·  Problem: Prophylactic measure.   ·  Plan: DVT ppx: Lovenox  Diet: DASH    neuro eval noted    discussed w/ daughter at bedside    
84 y/o F with PMH of HLD, macular degeneration, thalassemia minor and melanoma. Presents with AMS and hypoxia. Pt found to be febrile, tachycardic with hypoxia (to 80s on RA per EMS). Labs notable for leukocytosis, BC+ GNR. CXR with LLL opacity and UA+.
83 year old presents with leukocytosis, confusion weakness. BC are positive for Ecoli with no resistance markers  Denies any focal complaints and abdominal exam is benign.  no UTI symptoms    E.coli sepsis   urine culture positive     Denies focal complaints     Feels  better    WBC   uro sepsis   ct scan mild hydro but no obstruction   ceftriaxone  Day  5 of antibiotics    much better  alert  eating out of bed   urology note seen  pulmonary following      out of bed    
84yo F w/ HLD, macular degeneration, thalassemia minor and melanoma pw AMS and hypoxia found to have sepsis 2/2 UTI.  neuro called for AMS and abnormal CTH   In the ED febrile, tachy w/ hypoxia (80% on RA per EMS) w/ lactic acidosis w/ positive UA and CXR w/ c/f PNA vs atelectasis s/p Vanc, Zosyn, 2L IVF and HFNC for hypoxia.   + UA  Ucx with E. coli   blood cx with + GNR   + leukocytosis   CTH with L middle frontal hyperattenuation likely mineralization.  Bilateral occipital encephalomalacia   CT chest with B/L lower lobe opacities c/f atelectasis and/or PNA    CT abd: mild L hydro; superficial errosion of gastric fundus. endometrial thickening     Imprssion:   1) AMS likely toxic metabolic encephalopahty from sepsis 2/2 UTI   2) abnormal CTH.  bilateral occipital lobe encephalomalacia --> unclear etiology no known h/o strokes.  also frontal hyperattenuation likely mineralization     - zosyn--> changed to CTX for infection/ UTI   - on atorvastatin 20 QHS   - at some point non urgent would get MRI brain for further clarification of occiptial findings.  given encephalomalacia it seems chronic but possible old strokes.   - would start ASA 81mg daily for now based on CTH findings if no objection  - f/u ID    - MR pelvis pending   - GI and  eval   - Hemoglobin A1c and lipid panel  - b12, TSH if not already checked   - PT/OT/SS/SLP, OOBC  - check FS, glucose control <180  - GI/DVT ppx   - Thank you for allowing me to participate in the care of this patient. Call with questions.   - spoke with daughter in ED 3/11 and 3/12 bedside    Elias Leggett MD  Vascular Neurology  Office: [phone]     
84yo F w/ PMH of HLD, macular degeneration, thalassemia minor and melanoma pw AMS and hypoxia found to be in severe sepsis i/s/o UTI +/- PNA. 
This is an 82yo F w/ PMH of HLD, macular degeneration, thalassemia minor and melanoma who initially presented with AMS and hypoxia found to be in severe sepsis with E Coli bacteremia 2/2 UTI. CT A/P with mild left hydronephrosis with urothelial thickening and bilateral perinephric edema, findings of 2 mm non obstructing stone.   Pt started on Ceftriaxone 1 g q24 with improvement, CBC trended to normal and repeat BCx negative.   Pending repeat CT per urology given findings of stone and hydro.    #E Coli bacteremia  #Severe sepsis  #Non obstructing stone   #Thrombocytopenia, improved     Overall E Coli bacteremia 2/2 UTI/pyelonephritis, finding of mild L hydro and R nonobstructive 2mm stone, repeat CT A/P unchanged. Clinically improving at this time, will plan on 10 day course of antibiotics, can switch to oral when ready for d/c.         . On Ceftin now, prefer IV Ceftriaxone while inhouse and changing to Cipro 500 mg BID on discharge to finish 10 day course on 3/24                 
82yo F w/ PMH of HLD, macular degeneration, thalassemia minor and melanoma pw AMS and hypoxia found to be in severe sepsis i/s/o UTI +/- PNA. 
84 y/o F with PMH of HLD, macular degeneration, thalassemia minor and melanoma. Presents with AMS and hypoxia. Pt found to be febrile, tachycardic with hypoxia (to 80s on RA per EMS). Labs notable for leukocytosis, BC+ GNR. CXR with LLL opacity and UA+.
84yo F w/ PMH of HLD, macular degeneration, thalassemia minor and melanoma pw AMS and hypoxia found to be in severe sepsis i/s/o UTI +/- PNA. 
82 y/o F with PMH of HLD, macular degeneration, thalassemia minor and melanoma. Presents with AMS and hypoxia. Pt found to be febrile, tachycardic with hypoxia (to 80s on RA per EMS). Labs notable for leukocytosis, BC+ GNR. CXR with LLL opacity and UA+.
84yo F w/ PMH of HLD, macular degeneration, thalassemia minor and melanoma pw AMS and hypoxia found to be in severe sepsis i/s/o UTI +/- PNA. 
84 y/o F with PMH of HLD, macular degeneration, thalassemia minor and melanoma. Presents with AMS and hypoxia. Pt found to be febrile, tachycardic with hypoxia (to 80s on RA per EMS). Labs notable for leukocytosis, BC+ GNR. CXR with LLL opacity and UA+.
82 y/o F with PMH of HLD, macular degeneration, thalassemia minor and melanoma. Presents with AMS and hypoxia. Pt found to be febrile, tachycardic with hypoxia (to 80s on RA per EMS). Labs notable for leukocytosis, BC+ GNR. CXR with LLL opacity and UA+.
82yo F w/ PMH of HLD, macular degeneration, thalassemia minor and melanoma pw AMS and hypoxia found to be in severe sepsis i/s/o UTI +/- PNA. 
82yo F w/ PMH of HLD, macular degeneration, thalassemia minor and melanoma pw AMS and hypoxia found to be in severe sepsis i/s/o UTI +/- PNA. 
84yo F w/ PMH of HLD, macular degeneration, thalassemia minor and melanoma pw AMS and hypoxia found to be in severe sepsis i/s/o UTI +/- PNA.

## 2024-03-21 ENCOUNTER — NON-APPOINTMENT (OUTPATIENT)
Age: 84
End: 2024-03-21

## 2024-04-22 NOTE — PROGRESS NOTE ADULT - NS_MD_PANP_GEN_ALL_CORE
Spoke with [facility] and notified of need for STAT CT of abdomen pelvis.   Would recommend having done today d/t level of pain and advised may need to be open to some alternate locations from [place].     Transferred directly to imaging scheduling.  
Attending and PA/NP shared services statement (NON-critical care):
Attending and PA/NP shared services statement (NON-critical care):

## 2024-07-11 ENCOUNTER — APPOINTMENT (OUTPATIENT)
Dept: PODIATRY | Facility: CLINIC | Age: 84
End: 2024-07-11
Payer: MEDICARE

## 2024-07-11 DIAGNOSIS — M79.674 PAIN IN RIGHT TOE(S): ICD-10-CM

## 2024-07-11 DIAGNOSIS — M79.675 PAIN IN RIGHT TOE(S): ICD-10-CM

## 2024-07-11 DIAGNOSIS — B35.1 TINEA UNGUIUM: ICD-10-CM

## 2024-07-11 PROCEDURE — 11720 DEBRIDE NAIL 1-5: CPT

## 2024-08-27 NOTE — HISTORY OF PRESENT ILLNESS
Arrived to place midline with bedside RN Alonzo. Pre-procedure and timeout done with RN, discussed limitations of placement and allergies. Vital signs stable. Labs, allergies, medications, and code status reviewed. No contraindications noted.    Procedure explained to pt, including the risk and benefits of the procedure. All questions answered. Pt verbalizes understanding of the procedure and states no more questions.     Pt's basilic, brachial, cephalic are all easily collapsible with no indication for a clot. Vein selected is large enough for catheter (please see image below). Pt tolerated sterile procedure well, with no difficulty accessing right basilic vein, when accessed - blood was free flowing and non-pulsatile. Guidewire, introducer, and catheter went in smoothly. ML placement verification with blood return and flushes easily.      Nurses:  OK to use Midline.    Please replace all existing IV tubing with new IV tubing prior to using the ML for current IV infusions.  Please remove any PIVs from ML arm.  All of the above may be sources of infection or an increase chance of a clot.      Post procedure - reorganized pt table, placed pt in lowest position, with call light and educated on line care. Instructed pt/RN not to use arm for at least 30min to avoid bleeding. Reported off to bedside RN.      If you have any questions please call number below and dispatch will direct you to the PICC RN that is on call.    [phone]         [Sneakers] : andree [[email or web address]] : Patient presents today because of ingrown nail right fibular border. It is painful about 5/10. It is inflamed. It is a recurrent problem.